# Patient Record
Sex: FEMALE | Race: WHITE | NOT HISPANIC OR LATINO | URBAN - METROPOLITAN AREA
[De-identification: names, ages, dates, MRNs, and addresses within clinical notes are randomized per-mention and may not be internally consistent; named-entity substitution may affect disease eponyms.]

---
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
## 2019-11-11 ENCOUNTER — EMERGENCY (EMERGENCY)
Facility: HOSPITAL | Age: 19
LOS: 1 days | Discharge: HOME | End: 2019-11-11
Attending: EMERGENCY MEDICINE
Payer: COMMERCIAL

## 2019-11-11 VITALS
TEMPERATURE: 99 F | RESPIRATION RATE: 16 BRPM | SYSTOLIC BLOOD PRESSURE: 124 MMHG | OXYGEN SATURATION: 98 % | DIASTOLIC BLOOD PRESSURE: 84 MMHG | HEART RATE: 99 BPM

## 2019-11-11 VITALS
OXYGEN SATURATION: 100 % | SYSTOLIC BLOOD PRESSURE: 142 MMHG | HEART RATE: 112 BPM | HEIGHT: 62 IN | TEMPERATURE: 100 F | DIASTOLIC BLOOD PRESSURE: 95 MMHG | WEIGHT: 175.05 LBS | RESPIRATION RATE: 18 BRPM

## 2019-11-11 LAB
APPEARANCE UR: CLEAR — SIGNIFICANT CHANGE UP
BACTERIA # UR AUTO: ABNORMAL
BILIRUB UR-MCNC: NEGATIVE — SIGNIFICANT CHANGE UP
COLOR SPEC: YELLOW — SIGNIFICANT CHANGE UP
DIFF PNL FLD: ABNORMAL
EPI CELLS # UR: SIGNIFICANT CHANGE UP
GLUCOSE UR QL: 1000 MG/DL
HCG UR QL: NEGATIVE — SIGNIFICANT CHANGE UP
KETONES UR-MCNC: ABNORMAL
LEUKOCYTE ESTERASE UR-ACNC: NEGATIVE — SIGNIFICANT CHANGE UP
NITRITE UR-MCNC: NEGATIVE — SIGNIFICANT CHANGE UP
PH UR: 6 — SIGNIFICANT CHANGE UP (ref 5–8)
PROT UR-MCNC: 30 MG/DL
RBC CASTS # UR COMP ASSIST: ABNORMAL /HPF (ref 0–4)
SP GR SPEC: 1.02 — SIGNIFICANT CHANGE UP (ref 1.01–1.02)
UROBILINOGEN FLD QL: NEGATIVE MG/DL — SIGNIFICANT CHANGE UP
WBC UR QL: SIGNIFICANT CHANGE UP

## 2019-11-11 PROCEDURE — 87086 URINE CULTURE/COLONY COUNT: CPT

## 2019-11-11 PROCEDURE — 99283 EMERGENCY DEPT VISIT LOW MDM: CPT

## 2019-11-11 PROCEDURE — 99284 EMERGENCY DEPT VISIT MOD MDM: CPT

## 2019-11-11 PROCEDURE — 81025 URINE PREGNANCY TEST: CPT

## 2019-11-11 PROCEDURE — 81001 URINALYSIS AUTO W/SCOPE: CPT
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
## 2019-11-11 NOTE — ED ADULT NURSE NOTE - NSIMPLEMENTINTERV_GEN_ALL_ED
Implemented All Universal Safety Interventions:  [place] to call system. Call bell, personal items and telephone within reach. Instruct patient to call for assistance. Room bathroom lighting operational. Non-slip footwear when patient is off stretcher. Physically safe environment: no spills, clutter or unnecessary equipment. Stretcher in lowest position, wheels locked, appropriate side rails in place.

## 2019-11-11 NOTE — ED PROVIDER NOTE - PHYSICAL EXAMINATION
Const: Awake, alert and oriented. In no acute distress. Well appearing.  HEENT: NC/AT. Moist mucous membranes.  Neck:. Soft and supple. Full ROM without pain.  Cardiac:  +S1/S2. No murmurs. Peripheral pulses 2+ and symmetric. No LE edema.  Resp: Speaking in full sentences. No evidence of respiratory distress. No wheezes, rales or rhonchi.  Abd: Soft, non-tender, non-distended. Normal bowel sounds in all 4 quadrants. No guarding or rebound.  Genitlia: No lesions noted, internal: cervical os closed, no laceration noted, minimal bleeding noted in vaginal vault, no foreign body noted   Back: Spine midline and non-tender. No CVAT.  Skin: No rashes, abrasions or lacerations.  Lymph: No cervical lymphadenopathy.  Neuro: Awake, alert & oriented x 3. Moves all extremities symmetrically.

## 2019-11-11 NOTE — ED ADULT NURSE NOTE - OBJECTIVE STATEMENT
Patient is a 19 year old female, A&Ox3 in no apparent distress. c/o vaginal bleeding. Started around 0845pm. Patient states "it doesn't have that same smell as my period". Bleeding is not heavy.

## 2019-11-11 NOTE — ED PROVIDER NOTE - PATIENT PORTAL LINK FT
You can access the FollowMyHealth Patient Portal offered by [facility] by registering at the following website: [email or web address]. By joining [facility]’s FollowMyHealth portal, you will also be able to view your health information using other applications (apps) compatible with our system.

## 2019-11-11 NOTE — ED ADULT TRIAGE NOTE - CHIEF COMPLAINT QUOTE
patient states that she has vaginal bleed with cramping and clots has her LMP Oct 30, denies pregnany

## 2019-11-12 PROBLEM — E11.9 TYPE 2 DIABETES MELLITUS WITHOUT COMPLICATIONS: Chronic | Status: ACTIVE | Noted: 2018-10-29

## 2019-11-13 LAB
CULTURE RESULTS: SIGNIFICANT CHANGE UP
SPECIMEN SOURCE: SIGNIFICANT CHANGE UP

## 2020-06-04 NOTE — ED ADULT NURSE NOTE - NSFALLRSKASSESSTYPE_ED_ALL_ED
Spoke with Litzy via phone-- All concerns addressed including but not limited to diagnosis, treatment plan and overall prognosis.  Improved mentation.  Awaiting for bed now Initial (On Arrival)

## 2020-07-22 ENCOUNTER — TRANSCRIPTION ENCOUNTER (OUTPATIENT)
Age: 20
End: 2020-07-22

## 2020-07-24 ENCOUNTER — EMERGENCY (EMERGENCY)
Facility: HOSPITAL | Age: 20
LOS: 1 days | Discharge: HOME | End: 2020-07-24
Attending: EMERGENCY MEDICINE | Admitting: EMERGENCY MEDICINE
Payer: COMMERCIAL

## 2020-07-24 ENCOUNTER — EMERGENCY (EMERGENCY)
Facility: HOSPITAL | Age: 20
LOS: 0 days | Discharge: HOME | End: 2020-07-24
Attending: EMERGENCY MEDICINE
Payer: COMMERCIAL

## 2020-07-24 VITALS
OXYGEN SATURATION: 99 % | WEIGHT: 164.91 LBS | HEART RATE: 95 BPM | HEIGHT: 66 IN | DIASTOLIC BLOOD PRESSURE: 79 MMHG | RESPIRATION RATE: 18 BRPM | SYSTOLIC BLOOD PRESSURE: 127 MMHG | TEMPERATURE: 99 F

## 2020-07-24 VITALS
HEART RATE: 114 BPM | OXYGEN SATURATION: 100 % | WEIGHT: 164.91 LBS | SYSTOLIC BLOOD PRESSURE: 120 MMHG | HEIGHT: 66 IN | DIASTOLIC BLOOD PRESSURE: 84 MMHG | TEMPERATURE: 99 F | RESPIRATION RATE: 20 BRPM

## 2020-07-24 VITALS — RESPIRATION RATE: 18 BRPM | OXYGEN SATURATION: 100 % | HEART RATE: 102 BPM

## 2020-07-24 DIAGNOSIS — S09.90XA UNSPECIFIED INJURY OF HEAD, INITIAL ENCOUNTER: ICD-10-CM

## 2020-07-24 DIAGNOSIS — Z88.8 ALLERGY STATUS TO OTHER DRUGS, MEDICAMENTS AND BIOLOGICAL SUBSTANCES: ICD-10-CM

## 2020-07-24 DIAGNOSIS — R11.0 NAUSEA: ICD-10-CM
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
## 2020-07-24 DIAGNOSIS — S06.0X0A CONCUSSION WITHOUT LOSS OF CONSCIOUSNESS, INITIAL ENCOUNTER: ICD-10-CM
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
## 2020-07-24 PROCEDURE — 99283 EMERGENCY DEPT VISIT LOW MDM: CPT
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
## 2020-07-24 NOTE — ED ADULT NURSE NOTE - NSFALLRSKINDICATORS_ED_ALL_ED
Problem: Mobility Impaired (Adult and Pediatric)  Goal: *Acute Goals and Plan of Care (Insert Text)  Physical Therapy Goals  Initiated 1/11/2017 and to be accomplished within 7 day(s)  1. Patient will move from supine to sit and sit to supine in bed with minimal assistance. 2. Patient will transfer from bed to chair and chair to bed with minimal assistance using the least restrictive device. 3. Patient will perform sit to stand with minimal assistance. 4. Patient will ambulate with minimal assistance for 50 feet with the least restrictive device. 5. Patient will tolerate sitting EOB 5-10 minutes with Fair to Good balance in order to perform TE.  6. Patient will tolerate sitting up in bedside chair for 1-2 hour/day to improve respiratory capacity. PHYSICAL THERAPY TREATMENT     Patient: Debra Torre (89 y.o. female)  Date: 1/12/2017  Diagnosis: CVA (cerebral vascular accident) [facility]) <principal problem not specified>       Precautions:  Fall  Chart, physical therapy assessment, plan of care and goals were reviewed. ASSESSMENT:  Pt presents asleep with daughter in room. Pt able to be woken up to voice but was tired throughout session and required PT to ask to open her eyes while performing seated TE. Pt's daughter in room and has been with her mother all night, states she was up eating breakfast this morning but has been sleepy since breakfast. Pt transferred sup to sit with MIN assist then was able to sit EOB for TE. Pt required VC for exercise but was still following commands at slow pace. Pt transferred back to supine with Mod/MaxA at end of session and rolled to both sides with ModA to change ace pads 2/2 to incontinence of bladder. Pt left resting comfortably in chair position of bed with alarm activated.    Progression toward goals:  [ ]      Improving appropriately and progressing toward goals  [X]      Improving slowly and progressing toward goals  [ ]      Not making progress toward goals and plan of care will be adjusted       PLAN:  Patient continues to benefit from skilled intervention to address the above impairments. Continue treatment per established plan of care. Discharge Recommendations:  Joaquín Quesada  Further Equipment Recommendations for Discharge:  N/A       G-CODES:      Mobility  Current  CK= 40-59%   Goal  CI= 1-19%. The severity rating is based on the Level of Assistance required for Functional Mobility and ADLs. SUBJECTIVE:   Patient stated Okay.  when asked how she feels today. OBJECTIVE DATA SUMMARY:   Critical Behavior:  Lethargic, follows commands  Functional Mobility Training:  Bed Mobility:   Supine to Sit: Minimum assistance  Sit to Supine: Moderate assistance;Maximum assistance   Balance:  Sitting: Impaired; With support  Sitting - Static: Poor (constant support)  Sitting - Dynamic: Poor (constant support)  Therapeutic Exercises:   Seated with constant support and VC's: LAQ, AP, Hip ABD/ADD  [id number] bilaterally  Pain:  Pt reports 0/10 pain or discomfort prior to treatment. Pt reports 0/10 pain or discomfort post treatment. Activity Tolerance:   Pt tolerated activity well but was limited today 2/2 to being tired this AM.   Please refer to the flowsheet for vital signs taken during this treatment.   After treatment:   [ ] Patient left in no apparent distress sitting up in chair  [X] Patient left in no apparent distress in bed  [X] Call bell left within reach  [ ] Nursing notified  [X] Caregiver present  [X] Bed alarm activated      César Lewis PT   Time Calculation: 27 mins no

## 2020-07-24 NOTE — ED PROVIDER NOTE - PROGRESS NOTE DETAILS
feels much better, now calm, encounter explained to juan daniel, offered anxiety medication, but patient declined

## 2020-07-24 NOTE — ED PROVIDER NOTE - OBJECTIVE STATEMENT
21 yo female hx of dm on metformin BIB juan daniel with panic attack, was evaluated yesterday night at [facility], hit the top of her head against bottom of sink in bathroom while sorting laundry, no LOC, diagnosed with concussion, has no complaint of headache, here, states was hysterically crying so juan daniel drove her for further evaluation.  No nausea/vomiting.  No dizziness, no visual changes.

## 2020-07-24 NOTE — ED PROVIDER NOTE - NSFOLLOWUPINSTRUCTIONS_ED_ALL_ED_FT
Concussion    WHAT YOU NEED TO KNOW:    A concussion is a mild brain injury. It is usually caused by a bump or blow to the head from a fall, a motor vehicle crash, or a sports injury. Sometimes being shaken forcefully may cause a concussion.    DISCHARGE INSTRUCTIONS:    Have someone call 911 for any of the following:     Someone tries to wake you and cannot do so.      You have a seizure, increasing confusion, or a change in personality.      Your speech becomes slurred, or you have new vision problems.    Return to the emergency department if:     You have sudden and new vision problems.      You have a severe headache that does not go away.      You have arm or leg weakness, numbness, or new problems with coordination.      You have blood or clear fluid coming out of the ears or nose.    Contact your healthcare provider if:     You have nausea or are vomiting.      You feel more sleepy than usual.      Your symptoms get worse.      Your symptoms last longer than 6 weeks after the injury.      You have questions or concerns about your condition or care.    Medicines: You may need any of the following:     Acetaminophen decreases pain and fever. It is available without a doctor's order. Ask how much to take and how often to take it. Follow directions. Read the labels of all other medicines you are using to see if they also contain acetaminophen, or ask your doctor or pharmacist. Acetaminophen can cause liver damage if not taken correctly. Do not use more than 4 grams (4,000 milligrams) total of acetaminophen in one day.       NSAIDs help decrease swelling and pain or fever. This medicine is available with or without a doctor's order. NSAIDs can cause stomach bleeding or kidney problems in certain people. If you take blood thinner medicine, always ask your healthcare provider if NSAIDs are safe for you. Always read the medicine label and follow directions.      Take your medicine as directed. Contact your healthcare provider if you think your medicine is not helping or if you have side effects. Tell him or her if you are allergic to any medicine. Keep a list of the medicines, vitamins, and herbs you take. Include the amounts, and when and why you take them. Bring the list or the pill bottles to follow-up visits. Carry your medicine list with you in case of an emergency.    Self-care: Concussion symptoms usually go away within about 10 days, but they may last longer. The following may be recommended to manage your symptoms:     Rest from physical and mental activities as directed. Mental activities are those that require thinking, concentration, and attention. You will need to rest until your symptoms are gone. Rest will allow you to recover from your concussion. Ask your healthcare provider when you can return to work and other daily activities.      Have someone stay with you for the first 24 hours after your injury. Your healthcare provider should be contacted if your symptoms get worse, or you develop new symptoms.      Do not participate in sports and physical activities until your healthcare provider says it is okay. They could make your symptoms worse or lead to another concussion. Your healthcare provider will tell you when it is okay for you to return to sports or physical activities. Ask for more information about sports concussions.    Prevent another concussion:     Wear protective sports equipment that fits properly. Helmets help decrease your risk for a serious brain injury. Talk to your healthcare provider about ways you can decrease your risk for a concussion if you play sports.      Wear your seatbelt every time you travel. This helps to decrease your risk for a head injury if you are in a car accident.     Follow up with your healthcare provider as directed: Write down your questions so you remember to ask them during your visits.     FOLLOW UP EVALUATION  To ensure optimal concussion recovery, follow up with a doctor specialized in concussion management. An evaluation by a specialty concussion program can ensure timely return to activities.    We offer appointments through the [facility] Concussion [facility]’s Hotline at [phone].

## 2020-07-24 NOTE — ED PROVIDER NOTE - NS ED ROS FT
Constitutional: - Fever, - Chills, - Anorexia, - Fatigue, - Night sweats +anxiety  Eyes: - Discharge, - Irritation, - Redness, - Visual changes, - Light sensitivity, - Pain  EARS: - Ear Pain, - Tinnitus, - Decreased hearing  NOSE: - Congestion, - Bloody nose  MOUTH/THROAT: - Vocal Changes, - Drooling, - Sore throat  NECK: - Lumps, - Stiffness, - Pain  CV: - Palpitations, - Chest Pain, - Edema, - Syncope  RESP:  - Cough, - Shortness of Breath, - Dyspnea on Exertion, - Trouble speaking, - Pleuritic pain - Wheezing  GI: - Diarrhea, - Constipation, - Bloody stools, - Nausea, - Vomiting, - Abdominal Pain  : - Dysuria, -Frequency, - Hematuria, - Hesitancy, - Incontinence, - Saddle Anesthesia, - Abnormal discharge  MSK: - Myalgias, - Arthralgias, - Weakness, - Deformities, - Injuries  SKIN: - Color change, - Rash, - Swelling, - Ecchymosis, - Abrasion, - laceration  NEURO: - Change in behavior, - Dec. Alertness, - Headache, - Dizziness, - Change in speech, - Weakness, - Seizure-like activity, - Difficulty ambulating

## 2020-07-24 NOTE — ED PROVIDER NOTE - OBJECTIVE STATEMENT
19 y/o F with h/o DM  p/w mild left parietal headache and nausea s/p head injury this afternoon.  Pt notes she stood up in the laundry room and hit her head on the bottom of a sink.  Pt denies any LOC or amnesia.  Pt has had mild nausea but no vomiting.  Pt denies weakness, slurred speech, ataxia or other neuro deficits.  Pt has had good control of her glucose at home.  Pt denies taking any blood thinners or NSAIDS recently.

## 2020-07-24 NOTE — ED ADULT NURSE NOTE - CHPI ED NUR SYMPTOMS NEG
no seizure/no syncope/no blurred vision/no loss of consciousness/no weakness/no nausea/no dizziness/no confusion/no vomiting/no change in level of consciousness

## 2020-07-24 NOTE — ED PROVIDER NOTE - CARE PLAN
Principal Discharge DX:	Anxiety about health  Secondary Diagnosis:	Concussion without loss of consciousness, initial encounter

## 2020-07-24 NOTE — ED ADULT NURSE NOTE - NS ED NURSE LEVEL OF CONSCIOUSNESS MENTAL STATUS
ECLS Discontinuation Note:    Patient was transported to OR on ECMO via monitored cart with anesthesia and perfusion present for washout and possible decannulation. ECLS was discontinued 6/5/2020 @ 08:44 in the OR per perfusion.    Sandi Griffith, RT, RRT  6/5/2020 9:05 AM       Alert/Awake

## 2020-07-24 NOTE — ED PROVIDER NOTE - PATIENT PORTAL LINK FT
You can access the FollowMyHealth Patient Portal offered by [facility] by registering at the following website: [email or web address]. By joining [employer]’s FollowMyHealth portal, you will also be able to view your health information using other applications (apps) compatible with our system.

## 2020-07-24 NOTE — ED ADULT NURSE NOTE - CHIEF COMPLAINT QUOTE
c/o Headache. hit head on sink yesterday. Seen at [facility] at that time and D/C'd. tonight states "freaking out because hospital didn't tell me if I have a concussion". Brought to ER by boyfriend who "thought I was having a panic attack".

## 2020-07-24 NOTE — ED ADULT TRIAGE NOTE - CHIEF COMPLAINT QUOTE
patient hit head earlier this evening on ledge while standing up after picking up laundry. denies LOC. complains of worsening dizziness and nausea

## 2020-07-24 NOTE — ED ADULT TRIAGE NOTE - CHIEF COMPLAINT QUOTE
headache. hit head on sink yesterday. Seen at [facility] at that time and D/C'd. tonight states "freaking out because hospital didn't tell me if I have a concussion". c/o Headache. hit head on sink yesterday. Seen at [facility] at that time and D/C'd. tonight states "freaking out because hospital didn't tell me if I have a concussion". Brought to ER by boyfriend who "thought I was having a panic attack".

## 2020-07-24 NOTE — ED PROVIDER NOTE - PATIENT PORTAL LINK FT
You can access the FollowMyHealth Patient Portal offered by [facility] by registering at the following website: [email or web address]. By joining [employer]’s FollowMyHealth portal, you will also be able to view your health information using other applications (apps) compatible with our system.

## 2020-07-24 NOTE — ED PROVIDER NOTE - NSFOLLOWUPINSTRUCTIONS_ED_ALL_ED_FT
1) Follow-up with your PMD and concussion program [phone]. Call today / next business day for prompt follow-up.  2) Return to Emergency room for any worsening or persistent pain, weakness, fever, or any other concerning symptoms.  3) See attached instruction sheets for additional information, including information regarding signs and symptoms to look out for, reasons to seek immediate care and other important instructions.  4) Follow-up with any specialists as discussed / noted as well.

## 2020-07-24 NOTE — ED ADULT NURSE NOTE - NSIMPLEMENTINTERV_GEN_ALL_ED
Implemented All Universal Safety Interventions:  [place] to call system. Call bell, personal items and telephone within reach. Instruct patient to call for assistance. Room bathroom lighting operational. Non-slip footwear when patient is off stretcher. Physically safe environment: no spills, clutter or unnecessary equipment. Stretcher in lowest position, wheels locked, appropriate side rails in place.

## 2020-07-24 NOTE — ED ADULT NURSE NOTE - OBJECTIVE STATEMENT
Pt presents to er with complaints of hitting her head on a ledge a few hrs ago, denies change in vision, -laceration, states she feels nauseated at this time and wanted to come in due to paranoia about father dying a couple months ago from a concussion. Pt observed with steady ambulation, alert to person, place, time, situation, speech clear with normal pattern, safety maintained, will continue to monitor.

## 2020-07-24 NOTE — ED ADULT NURSE NOTE - OBJECTIVE STATEMENT
Pt presents to the ED with reports of a panic attack at home. Pt states she was rocking back and forth, felt as though she was going to vomit, crying and could not control herself. Pt was brought to the ED by her fiance because they felt her concussion was getting worse. Pt presently denies any HA, blurry vision, N/V. Speech is clear and gait is steady. Pt is tearful and reports PTSD from her fathers death.

## 2020-07-24 NOTE — ED PROVIDER NOTE - PHYSICAL EXAMINATION
Gen: Alert, NAD, appears anxious  Head/eyes: NC/AT, PERRL, EOMI, normal lids/conjunctiva, no scleral icterus  ENT: airway patent  Neck: supple, no tenderness/meningismus/JVD, Trachea midline  Pulm/lung: Bilateral clear BS, normal resp effort, no wheeze/stridor/retractions  CV/heart: RRR, no M/R/G, +2 dist pulses (radial, pedal DP/PT, popliteal)  GI/Abd: soft, NT/ND, +BS, no guarding/rebound tenderness  Musculoskeletal: no edema/erythema/cyanosis, FROM in all extremities, no C/T/L spine ttp  Skin: no rash, no vesicles, no petechaie, no ecchymosis, no swelling  Neuro: AAOx3, CN 2-12 intact, normal sensation, 5/5 motor strength in all extremities, normal gait, no dysmetria

## 2020-11-02 ENCOUNTER — APPOINTMENT (OUTPATIENT)
Dept: FAMILY MEDICINE | Facility: CLINIC | Age: 20
End: 2020-11-02
Payer: COMMERCIAL

## 2020-11-02 VITALS
TEMPERATURE: 99.1 F | DIASTOLIC BLOOD PRESSURE: 60 MMHG | BODY MASS INDEX: 27.32 KG/M2 | OXYGEN SATURATION: 98 % | WEIGHT: 170 LBS | SYSTOLIC BLOOD PRESSURE: 120 MMHG | HEIGHT: 66 IN | HEART RATE: 79 BPM

## 2020-11-02 DIAGNOSIS — Z86.59 PERSONAL HISTORY OF OTHER MENTAL AND BEHAVIORAL DISORDERS: ICD-10-CM

## 2020-11-02 DIAGNOSIS — Z00.00 ENCOUNTER FOR GENERAL ADULT MEDICAL EXAMINATION W/OUT ABNORMAL FINDINGS: ICD-10-CM

## 2020-11-02 DIAGNOSIS — Z82.49 FAMILY HISTORY OF ISCHEMIC HEART DISEASE AND OTHER DISEASES OF THE CIRCULATORY SYSTEM: ICD-10-CM

## 2020-11-02 DIAGNOSIS — Z86.39 PERSONAL HISTORY OF OTHER ENDOCRINE, NUTRITIONAL AND METABOLIC DISEASE: ICD-10-CM

## 2020-11-02 DIAGNOSIS — Z83.3 FAMILY HISTORY OF DIABETES MELLITUS: ICD-10-CM

## 2020-11-02 DIAGNOSIS — Z78.9 OTHER SPECIFIED HEALTH STATUS: ICD-10-CM

## 2020-11-02 PROCEDURE — 99072 ADDL SUPL MATRL&STAF TM PHE: CPT

## 2020-11-02 PROCEDURE — 99214 OFFICE O/P EST MOD 30 MIN: CPT | Mod: 25

## 2020-11-02 PROCEDURE — 99385 PREV VISIT NEW AGE 18-39: CPT | Mod: 25

## 2020-11-02 RX ORDER — LEVONORGESTREL AND ETHINYL ESTRADIOL 0.1-0.02MG
0.1-2 KIT ORAL
Qty: 84 | Refills: 0 | Status: DISCONTINUED | COMMUNITY
Start: 2020-08-05 | End: 2020-11-02

## 2020-11-02 RX ORDER — METRONIDAZOLE 7.5 MG/G
0.75 GEL VAGINAL
Qty: 70 | Refills: 0 | Status: DISCONTINUED | COMMUNITY
Start: 2020-09-28 | End: 2020-11-02

## 2020-11-02 RX ORDER — METFORMIN HYDROCHLORIDE 1000 MG/1
1000 TABLET, COATED ORAL
Refills: 0 | Status: DISCONTINUED | COMMUNITY
End: 2020-11-02

## 2020-11-02 RX ORDER — ESCITALOPRAM OXALATE 20 MG/1
20 TABLET, FILM COATED ORAL
Refills: 0 | Status: DISCONTINUED | COMMUNITY
End: 2020-11-02

## 2020-11-17 ENCOUNTER — TRANSCRIPTION ENCOUNTER (OUTPATIENT)
Age: 20
End: 2020-11-17

## 2020-11-30 ENCOUNTER — TRANSCRIPTION ENCOUNTER (OUTPATIENT)
Age: 20
End: 2020-11-30

## 2020-12-02 ENCOUNTER — RX RENEWAL (OUTPATIENT)
Age: 20
End: 2020-12-02

## 2020-12-29 ENCOUNTER — RX RENEWAL (OUTPATIENT)
Age: 20
End: 2020-12-29

## 2021-01-22 ENCOUNTER — RX RENEWAL (OUTPATIENT)
Age: 21
End: 2021-01-22

## 2021-03-19 ENCOUNTER — APPOINTMENT (OUTPATIENT)
Dept: FAMILY MEDICINE | Facility: CLINIC | Age: 21
End: 2021-03-19
Payer: COMMERCIAL

## 2021-03-19 VITALS
WEIGHT: 177 LBS | DIASTOLIC BLOOD PRESSURE: 70 MMHG | HEART RATE: 113 BPM | BODY MASS INDEX: 28.45 KG/M2 | SYSTOLIC BLOOD PRESSURE: 110 MMHG | HEIGHT: 66 IN

## 2021-03-19 DIAGNOSIS — E11.9 TYPE 2 DIABETES MELLITUS W/OUT COMPLICATIONS: ICD-10-CM

## 2021-03-19 DIAGNOSIS — F32.9 MAJOR DEPRESSIVE DISORDER, SINGLE EPISODE, UNSPECIFIED: ICD-10-CM

## 2021-03-19 PROCEDURE — G0444 DEPRESSION SCREEN ANNUAL: CPT

## 2021-03-19 PROCEDURE — 99072 ADDL SUPL MATRL&STAF TM PHE: CPT

## 2021-03-19 PROCEDURE — 99214 OFFICE O/P EST MOD 30 MIN: CPT | Mod: 25

## 2021-03-19 PROCEDURE — 36415 COLL VENOUS BLD VENIPUNCTURE: CPT

## 2021-03-19 NOTE — HISTORY OF PRESENT ILLNESS
[FreeTextEntry1] : Med Renewal/ Follow up [de-identified] : MILLI PIZARRO is a 21 year year old female patient presenting to the clinic today for follow up and medication renewal. Mood is okay. She looks very tired and states she does not intake enough water. She has not done blood work because she lost the script.\par

## 2021-03-19 NOTE — ASSESSMENT
[FreeTextEntry1] : MILLI PIZARRO is a 21 year year old female patient presenting to the clinic today for follow up and medication renewal. \par \par # depression\par # DM\par \par # PHQ- 9 Behavioral Health Screenin

## 2021-03-19 NOTE — END OF VISIT
[FreeTextEntry3] : This note was written by Shelley Rae on 03/19/2021, acting as a scribe for Dr. Nikolai MD.\par \par All medical record entries were at my, Dr. Sangeeta Menchaca MD, direction and personally dictated by me in 03/19/2021. I have personally reviewed the chart and agree that the record accurately reflects my personal performance of the history, physical exam, assessment, and plan.\par

## 2021-03-22 LAB
ALBUMIN SERPL ELPH-MCNC: 4.4 G/DL
ALP BLD-CCNC: 59 U/L
ALT SERPL-CCNC: 29 U/L
ANION GAP SERPL CALC-SCNC: 16 MMOL/L
AST SERPL-CCNC: 19 U/L
BASOPHILS # BLD AUTO: 0.06 K/UL
BASOPHILS NFR BLD AUTO: 0.6 %
BILIRUB SERPL-MCNC: 0.2 MG/DL
BUN SERPL-MCNC: 8 MG/DL
CALCIUM SERPL-MCNC: 9.6 MG/DL
CHLORIDE SERPL-SCNC: 102 MMOL/L
CO2 SERPL-SCNC: 22 MMOL/L
CREAT SERPL-MCNC: 0.35 MG/DL
EOSINOPHIL # BLD AUTO: 0.15 K/UL
EOSINOPHIL NFR BLD AUTO: 1.4 %
ESTIMATED AVERAGE GLUCOSE: 186 MG/DL
GLUCOSE SERPL-MCNC: 247 MG/DL
HBA1C MFR BLD HPLC: 8.1 %
HCT VFR BLD CALC: 39.6 %
HGB BLD-MCNC: 12.9 G/DL
IMM GRANULOCYTES NFR BLD AUTO: 0.5 %
LYMPHOCYTES # BLD AUTO: 3.69 K/UL
LYMPHOCYTES NFR BLD AUTO: 34.4 %
MAN DIFF?: NORMAL
MCHC RBC-ENTMCNC: 27.9 PG
MCHC RBC-ENTMCNC: 32.6 GM/DL
MCV RBC AUTO: 85.5 FL
MONOCYTES # BLD AUTO: 0.57 K/UL
MONOCYTES NFR BLD AUTO: 5.3 %
NEUTROPHILS # BLD AUTO: 6.22 K/UL
NEUTROPHILS NFR BLD AUTO: 57.8 %
PLATELET # BLD AUTO: 333 K/UL
POTASSIUM SERPL-SCNC: 3.9 MMOL/L
PROT SERPL-MCNC: 7.1 G/DL
RBC # BLD: 4.63 M/UL
RBC # FLD: 12.9 %
SODIUM SERPL-SCNC: 139 MMOL/L
TSH SERPL-ACNC: 1.72 UIU/ML
WBC # FLD AUTO: 10.74 K/UL

## 2021-04-20 ENCOUNTER — RX RENEWAL (OUTPATIENT)
Age: 21
End: 2021-04-20

## 2021-06-23 ENCOUNTER — RX RENEWAL (OUTPATIENT)
Age: 21
End: 2021-06-23

## 2021-06-27 RX ORDER — ESCITALOPRAM OXALATE 20 MG/1
20 TABLET ORAL
Qty: 30 | Refills: 0 | Status: ACTIVE | COMMUNITY
Start: 2020-04-30 | End: 1900-01-01

## 2021-06-29 ENCOUNTER — RX RENEWAL (OUTPATIENT)
Age: 21
End: 2021-06-29

## 2021-09-01 ENCOUNTER — RX RENEWAL (OUTPATIENT)
Age: 21
End: 2021-09-01

## 2021-09-01 RX ORDER — METFORMIN HYDROCHLORIDE 1000 MG/1
1000 TABLET, COATED ORAL
Qty: 60 | Refills: 0 | Status: ACTIVE | COMMUNITY
Start: 2020-05-02 | End: 1900-01-01

## 2021-12-27 ENCOUNTER — EMERGENCY (EMERGENCY)
Facility: HOSPITAL | Age: 21
LOS: 1 days | Discharge: LEFT BEFORE DISCHARGE | End: 2021-12-27
Payer: SELF-PAY

## 2021-12-27 PROCEDURE — L9992: CPT

## 2021-12-29 ENCOUNTER — RX RENEWAL (OUTPATIENT)
Age: 21
End: 2021-12-29

## 2022-08-25 ENCOUNTER — TRANSCRIPTION ENCOUNTER (OUTPATIENT)
Age: 22
End: 2022-08-25

## 2022-09-19 ENCOUNTER — INPATIENT (INPATIENT)
Facility: HOSPITAL | Age: 22
LOS: 3 days | Discharge: HOME | End: 2022-09-23
Attending: PSYCHIATRY & NEUROLOGY | Admitting: PSYCHIATRY & NEUROLOGY
Payer: COMMERCIAL

## 2022-09-19 VITALS
HEIGHT: 66 IN | HEART RATE: 106 BPM | SYSTOLIC BLOOD PRESSURE: 128 MMHG | OXYGEN SATURATION: 98 % | DIASTOLIC BLOOD PRESSURE: 78 MMHG | TEMPERATURE: 97 F | RESPIRATION RATE: 20 BRPM

## 2022-09-19 DIAGNOSIS — F33.9 MAJOR DEPRESSIVE DISORDER, RECURRENT, UNSPECIFIED: ICD-10-CM

## 2022-09-19 LAB
ALBUMIN SERPL ELPH-MCNC: 4.5 G/DL — SIGNIFICANT CHANGE UP (ref 3.3–5)
ALP SERPL-CCNC: 44 U/L — SIGNIFICANT CHANGE UP (ref 40–120)
ALT FLD-CCNC: 17 U/L — SIGNIFICANT CHANGE UP (ref 4–33)
AMPHET UR-MCNC: NEGATIVE — SIGNIFICANT CHANGE UP
ANION GAP SERPL CALC-SCNC: 15 MMOL/L — HIGH (ref 7–14)
APAP SERPL-MCNC: <10 UG/ML — LOW (ref 15–25)
APPEARANCE UR: ABNORMAL
AST SERPL-CCNC: 18 U/L — SIGNIFICANT CHANGE UP (ref 4–32)
BACTERIA # UR AUTO: ABNORMAL
BARBITURATES UR SCN-MCNC: NEGATIVE — SIGNIFICANT CHANGE UP
BASOPHILS # BLD AUTO: 0.04 K/UL — SIGNIFICANT CHANGE UP (ref 0–0.2)
BASOPHILS NFR BLD AUTO: 0.4 % — SIGNIFICANT CHANGE UP (ref 0–2)
BENZODIAZ UR-MCNC: NEGATIVE — SIGNIFICANT CHANGE UP
BILIRUB SERPL-MCNC: 0.3 MG/DL — SIGNIFICANT CHANGE UP (ref 0.2–1.2)
BILIRUB UR-MCNC: NEGATIVE — SIGNIFICANT CHANGE UP
BUN SERPL-MCNC: 6 MG/DL — LOW (ref 7–23)
CALCIUM SERPL-MCNC: 9.5 MG/DL — SIGNIFICANT CHANGE UP (ref 8.4–10.5)
CHLORIDE SERPL-SCNC: 103 MMOL/L — SIGNIFICANT CHANGE UP (ref 98–107)
CO2 SERPL-SCNC: 23 MMOL/L — SIGNIFICANT CHANGE UP (ref 22–31)
COCAINE METAB.OTHER UR-MCNC: NEGATIVE — SIGNIFICANT CHANGE UP
COLOR SPEC: SIGNIFICANT CHANGE UP
CREAT SERPL-MCNC: 0.49 MG/DL — LOW (ref 0.5–1.3)
CREATININE URINE RESULT, DAU: 104 MG/DL — SIGNIFICANT CHANGE UP
DIFF PNL FLD: ABNORMAL
EGFR: 136 ML/MIN/1.73M2 — SIGNIFICANT CHANGE UP
EOSINOPHIL # BLD AUTO: 0.05 K/UL — SIGNIFICANT CHANGE UP (ref 0–0.5)
EOSINOPHIL NFR BLD AUTO: 0.5 % — SIGNIFICANT CHANGE UP (ref 0–6)
EPI CELLS # UR: 18 /HPF — HIGH (ref 0–5)
ETHANOL SERPL-MCNC: <10 MG/DL — SIGNIFICANT CHANGE UP
FLUAV AG NPH QL: SIGNIFICANT CHANGE UP
FLUBV AG NPH QL: SIGNIFICANT CHANGE UP
GLUCOSE SERPL-MCNC: 273 MG/DL — HIGH (ref 70–99)
GLUCOSE UR QL: ABNORMAL
HCT VFR BLD CALC: 40.5 % — SIGNIFICANT CHANGE UP (ref 34.5–45)
HGB BLD-MCNC: 13.3 G/DL — SIGNIFICANT CHANGE UP (ref 11.5–15.5)
HYALINE CASTS # UR AUTO: 1 /LPF — SIGNIFICANT CHANGE UP (ref 0–7)
IANC: 6.96 K/UL — SIGNIFICANT CHANGE UP (ref 1.8–7.4)
IMM GRANULOCYTES NFR BLD AUTO: 0.5 % — SIGNIFICANT CHANGE UP (ref 0–0.9)
KETONES UR-MCNC: NEGATIVE — SIGNIFICANT CHANGE UP
LEUKOCYTE ESTERASE UR-ACNC: ABNORMAL
LYMPHOCYTES # BLD AUTO: 2.09 K/UL — SIGNIFICANT CHANGE UP (ref 1–3.3)
LYMPHOCYTES # BLD AUTO: 21.9 % — SIGNIFICANT CHANGE UP (ref 13–44)
MCHC RBC-ENTMCNC: 28.3 PG — SIGNIFICANT CHANGE UP (ref 27–34)
MCHC RBC-ENTMCNC: 32.8 GM/DL — SIGNIFICANT CHANGE UP (ref 32–36)
MCV RBC AUTO: 86.2 FL — SIGNIFICANT CHANGE UP (ref 80–100)
METHADONE UR-MCNC: NEGATIVE — SIGNIFICANT CHANGE UP
MONOCYTES # BLD AUTO: 0.36 K/UL — SIGNIFICANT CHANGE UP (ref 0–0.9)
MONOCYTES NFR BLD AUTO: 3.8 % — SIGNIFICANT CHANGE UP (ref 2–14)
NEUTROPHILS # BLD AUTO: 6.96 K/UL — SIGNIFICANT CHANGE UP (ref 1.8–7.4)
NEUTROPHILS NFR BLD AUTO: 72.9 % — SIGNIFICANT CHANGE UP (ref 43–77)
NITRITE UR-MCNC: NEGATIVE — SIGNIFICANT CHANGE UP
NRBC # BLD: 0 /100 WBCS — SIGNIFICANT CHANGE UP (ref 0–0)
NRBC # FLD: 0 K/UL — SIGNIFICANT CHANGE UP (ref 0–0)
OPIATES UR-MCNC: NEGATIVE — SIGNIFICANT CHANGE UP
OXYCODONE UR-MCNC: NEGATIVE — SIGNIFICANT CHANGE UP
PCP SPEC-MCNC: SIGNIFICANT CHANGE UP
PCP UR-MCNC: NEGATIVE — SIGNIFICANT CHANGE UP
PH UR: 6 — SIGNIFICANT CHANGE UP (ref 5–8)
PLATELET # BLD AUTO: 395 K/UL — SIGNIFICANT CHANGE UP (ref 150–400)
POTASSIUM SERPL-MCNC: 3.8 MMOL/L — SIGNIFICANT CHANGE UP (ref 3.5–5.3)
POTASSIUM SERPL-SCNC: 3.8 MMOL/L — SIGNIFICANT CHANGE UP (ref 3.5–5.3)
PROT SERPL-MCNC: 7.8 G/DL — SIGNIFICANT CHANGE UP (ref 6–8.3)
PROT UR-MCNC: ABNORMAL
RBC # BLD: 4.7 M/UL — SIGNIFICANT CHANGE UP (ref 3.8–5.2)
RBC # FLD: 12.8 % — SIGNIFICANT CHANGE UP (ref 10.3–14.5)
RBC CASTS # UR COMP ASSIST: 2 /HPF — SIGNIFICANT CHANGE UP (ref 0–4)
RSV RNA NPH QL NAA+NON-PROBE: SIGNIFICANT CHANGE UP
SALICYLATES SERPL-MCNC: <0.3 MG/DL — LOW (ref 15–30)
SARS-COV-2 RNA SPEC QL NAA+PROBE: SIGNIFICANT CHANGE UP
SODIUM SERPL-SCNC: 141 MMOL/L — SIGNIFICANT CHANGE UP (ref 135–145)
SP GR SPEC: 1.01 — SIGNIFICANT CHANGE UP (ref 1.01–1.05)
THC UR QL: NEGATIVE — SIGNIFICANT CHANGE UP
TOXICOLOGY SCREEN, DRUGS OF ABUSE, SERUM RESULT: SIGNIFICANT CHANGE UP
UROBILINOGEN FLD QL: SIGNIFICANT CHANGE UP
WBC # BLD: 9.55 K/UL — SIGNIFICANT CHANGE UP (ref 3.8–10.5)
WBC # FLD AUTO: 9.55 K/UL — SIGNIFICANT CHANGE UP (ref 3.8–10.5)
WBC UR QL: 27 /HPF — HIGH (ref 0–5)

## 2022-09-19 PROCEDURE — 93010 ELECTROCARDIOGRAM REPORT: CPT

## 2022-09-19 PROCEDURE — 99285 EMERGENCY DEPT VISIT HI MDM: CPT

## 2022-09-19 PROCEDURE — 99285 EMERGENCY DEPT VISIT HI MDM: CPT | Mod: 25

## 2022-09-19 RX ORDER — METFORMIN HYDROCHLORIDE 850 MG/1
500 TABLET ORAL
Refills: 0 | Status: DISCONTINUED | OUTPATIENT
Start: 2022-09-19 | End: 2022-09-23

## 2022-09-19 RX ORDER — ESCITALOPRAM OXALATE 10 MG/1
15 TABLET, FILM COATED ORAL DAILY
Refills: 0 | Status: DISCONTINUED | OUTPATIENT
Start: 2022-09-19 | End: 2022-09-23

## 2022-09-19 RX ADMIN — METFORMIN HYDROCHLORIDE 500 MILLIGRAM(S): 850 TABLET ORAL at 20:58

## 2022-09-19 NOTE — ED BEHAVIORAL HEALTH ASSESSMENT NOTE - HPI (INCLUDE ILLNESS QUALITY, SEVERITY, DURATION, TIMING, CONTEXT, MODIFYING FACTORS, ASSOCIATED SIGNS AND SYMPTOMS)
22F, domiciled in apartment in mom's house, employed at a , student at [employer], with a reported history of depression, anxiety and ptsd, no prior psychiatric hospitalizations, reporting 4 prior suicide attempts via hanging, recreational cannabis user (edibles), no history of violence, PMH DM2, brought in by ex-boyfriend for worsening depression and suicidality.  Patient states "I attempted yesterday" and describes attempting to hang self but "did not jump". A friend came over and discovered the scarf around the neck. Patient reports feeling ambivalent today, not currently suicidal, "it is not the answer to my problems" and is not sure if she would have completed the act. Patient reports 2 major stressors this weekend, discovering her financial aid for school fell through, and breaking up with boyfriend. Patient states life is a "[place] of sh*t". Notes there are times things are going well, she feels really hopeful, and other times feels "what is the point", stops taking meds. Describes mood as "inconsistent" and notes extreme emotional reactivity to stressors. Currently mood is depressed, appetite is low, energy is low, feels decreased enjoyment in things, guilt. No homicidal ideation, concentration is ok. Endorses anxiety with racing/ruminative thoughts, panic attacks (short of breath, feels hot and dizzy), lasting anywhere from a few minutes to 'hours'. Denies current psychotic symptoms though describes prior AH (mostly pt's voice) and VH (sees a friend's face as a monster, sees people running along the street) - notes these have not occurred recently since being on lexapro. Reports lexapro is prescribed by Dr. Gale, whom she sees every 3-6 months. Currently poor adherence to Lexapro. Prior trial of Zoloft which pt did not like. No other med trials. Denies history of self injurious behavior. Denies prior history of excess energy, decreased need for sleep, hypersexuality, excess spending, talkativeness.   See  note for collateral information.   Attempted to call Dr. Gale, unable to locate correct office #. Patient also reports meeting with therapist last week but does not remember contact information.

## 2022-09-19 NOTE — ED ADULT NURSE NOTE - NSIMPLEMENTINTERV_GEN_ALL_ED
Implemented All Universal Safety Interventions:  [place] to call system. Call bell, personal items and telephone within reach. Instruct patient to call for assistance. Room bathroom lighting operational. Non-slip footwear when patient is off stretcher. Physically safe environment: no spills, clutter or unnecessary equipment. Stretcher in lowest position, wheels locked, appropriate side rails in place.

## 2022-09-19 NOTE — ED BEHAVIORAL HEALTH ASSESSMENT NOTE - OTHER PAST PSYCHIATRIC HISTORY (INCLUDE DETAILS REGARDING ONSET, COURSE OF ILLNESS, INPATIENT/OUTPATIENT TREATMENT)
no prior psychiatric admissions. currently prescribed Lexapro 15mg by Dr Gale. had a prior trial of zoloft, pt did not like it. no reported history of self injurious behavior. reports 4 prior suicide attempt via attempted hanging

## 2022-09-19 NOTE — ED PROVIDER NOTE - PROGRESS NOTE DETAILS
NORBERTO Coffey- blood work wnl, ua - contamination, - pt denies any urgency , frequencies, painful urination, reports recently finished her menstrual period, - ua culture sent

## 2022-09-19 NOTE — ED PROVIDER NOTE - NSICDXPASTMEDICALHX_GEN_ALL_CORE_FT
PAST MEDICAL HISTORY:  MDD (major depressive disorder)     Type 2 diabetes mellitus without complication, without long-term current use of insulin

## 2022-09-19 NOTE — BH PATIENT PROFILE - HOME MEDICATIONS
Lexapro , 15 milligram(s) orally once a day  metFORMIN 500 mg oral tablet , 1 tab(s) orally 2 times a day

## 2022-09-19 NOTE — ED BEHAVIORAL HEALTH ASSESSMENT NOTE - RISK ASSESSMENT
Moderate Acute Suicide Risk acute risks include depressive episode, recent suicide attempt, educational and relationship stressors, poor medication adherence. chronic risks include family history of bipolar, history of trauma, prior suicide attempts/gestures as an adolescent. protective factors include help seeking behavior, employed, stable domicile, no history of violence, no known access to weapons, no current SI/HI. MODERATE IMMINENT risk to self requiring inpatient level of care as above.

## 2022-09-19 NOTE — ED ADULT TRIAGE NOTE - CHIEF COMPLAINT QUOTE
Pt attempted suicide by hanging herself yesterday. Pt with no visible bruising to neck. Pt non compliant with medication.

## 2022-09-19 NOTE — ED BEHAVIORAL HEALTH NOTE - BEHAVIORAL HEALTH NOTE
AS per request of provider, writer contacted patient’s ex boyfriend Bakari ( [phone]) to obtain collateral information. The following information is per Bakari.    Patient is a 21 YO female domiciled w/ uncle and mother, hx of depression, BIB Ex boyfriend for suicide attempt by hanging yesterday. Bakari reports they broke up on 9/17 and patient didn’t take it well. Patient reported to him yesterday that she tried to hang herself and her friend interrupted the attempt. Patient described to Bakair that she was hanging and was in and out of conscious. Bakari says no bruises were visible. Patient was advised to go to [facility] yesterday by Bakari. Bakari says patient went and was advised to go to the ED which she did not follow through with. Bakari offered to take the patient to the ED today and patient agreed. Bakari reports patient has multiple suicide attempts but did not know the details. He reports patient has a psych/therapist but does not know details. Bakari says patient is prescribed psych medication but is not compliant w/ medication. Bakari says patient and him dated for 3 weeks and have known each other for a month. Stressors include the breakup, patient losing financial aid for college and dropping out of [employer] last week because of it and a poor relationship w/ her mother. Patient is not violent or aggressive. Patient uses edible marijuana 2x a week. Patient has no prior psych admissions. Medical problems include diabetes (on metformin). Bakari unsure of patient’s covid vaccine status and reports she has no recent travel or exposure.

## 2022-09-19 NOTE — ED ADULT NURSE NOTE - OBJECTIVE STATEMENT
Pt attempted suicide by hanging herself yesterday. Pt with no visible bruising to neck. Pt non compliant with medication.  Patient brought to  area for evaluation. Pt states she tied the rope to a pole and was putting it around her neck but her friend who knew that she was not well and she came over before she could pull the rope around her neck. Calm and cooperative at this time. L:abs drawn and sent.  Waiting for results/admission.  NHUNG Long

## 2022-09-19 NOTE — ED BEHAVIORAL HEALTH ASSESSMENT NOTE - ADDITIONAL DETAILS ALL
attempted hanging yesterday, interrupted by friend. reports 3 episodes of prior attempted hanging as an adolescent (twice at age 8, once at age 13)

## 2022-09-19 NOTE — BH PATIENT PROFILE - FALL HARM RISK - FALL HARM RISK
Chief Complaint   Patient presents with    Blood Pressure Check     Patient in office today for bp check; have no c/o.    1. Have you been to the ER, urgent care clinic since your last visit? Hospitalized since your last visit? No    2. Have you seen or consulted any other health care providers outside of the [address] [place] [address] since your last visit? Include any pap smears or colon screening. No    Pt tried the prozac but did not toelrate SEs. Has been taking klonopin as needed for acute anxiety which helps. History of slamming her left index finger in the car door. Had 5 sutures placed but they did not do any imaging in ED. Painful with certain movements. Pain with ROM. Hard time bending the joint. Denies any other concerns at this time. Chief Complaint   Patient presents with    Blood Pressure Check     she is a 44y.o. year old female who presents for evalution. Reviewed PmHx, RxHx, FmHx, SocHx, AllgHx and updated and dated in the chart. Review of Systems - negative except as listed above in the HPI    Objective:     Vitals:    01/20/17 1112   BP: 144/88   Pulse: 83   Resp: 18   Temp: 98.8 °F (37.1 °C)   TempSrc: Oral   SpO2: 97%   Weight: 317 lb (143.8 kg)   Height: 5' 8\" (1.727 m)     Physical Examination: General appearance - alert, well appearing, and in no distress  Chest - clear to auscultation, no wheezes, rales or rhonchi, symmetric air entry  Heart - normal rate, regular rhythm, normal S1, S2, no murmurs  Musculoskeletal - abnormal exam of left second digit - PIP joint is swollen, unable to bend    Assessment/ Plan:   Vero was seen today for blood pressure check. Diagnoses and all orders for this visit:    Prediabetes  Continue metformin daily. Continue with efforts to follow a healthy diet and exercise as tolerated. Pt is motivated to get a1c down. Follow up in 3 months to repeat labs. Essential hypertension  BP improved.  Continue norvasc daily and continue to monitor. Finger pain, left  -     XR 2ND FINGER LT MIN 2 V; Future  Recommended pt get xray for further evaluation. Will notify results and deviate plan based on findings. Follow-up Disposition:  Return in about 3 months (around 4/20/2017) for repeat labs and follow up. I have discussed the diagnosis with the patient and the intended plan as seen in the above orders. The patient has received an after-visit summary and questions were answered concerning future plans. Medication Side Effects and Warnings were discussed with patient: yes  Patient Labs were reviewed and or requested: yes  Patient Past Records were reviewed and or requested  yes  Patient / Caregiver Understanding of treatment plan was verbalized during office visit AUSTIN Joyce    Patient Instructions   Try eating a high protein breakfast. I recommend nonfat greek yogurt every morning with a small amount of the trail mix or granola. I recommend eating an apple as a mid morning snack. You can add a little bit of peanut butter for the extra protein. For lunch I recommend a protein like fish, chicken, turkey breast, etc. If you are going to have a sandwich try whole grain bread and start with half a sandwich to see if that satiates you. You can put as many veggies on it as you want like onion, tomato, lettuce. Try mustard instead of villa because villa is high in fat. If you need a crunchy/salty side try pop chips or santiago chips. They are less fattening that potato chips. For a mid afternoon snack I recommend carrots with humus. Humus is so good for you and so filling due to the high protein. For dinner stick with the protein like fish, chicken or turkey. For starch I recommend quinoa, brown rice, sweet potato, or cous cous. Always eat a vegetable with your dinner. Spinach and kale are great because they fill you up and are full of vitamins and minerals.      I know that its difficult to stop craving sweets in the evening. For this I recommend purple or green grapes because they will curb your craving. Another idea is any of those \"100 calorie\" desserts like skinny cow. Just make sure you only have one. Drink as much water as you possibly can all day long. Try adding fresh slices of lemon to curb cravings. Also purchase kellen seeds. If you sprinkle them on your food, they \"blow up\" in the stomach and make you feel anderson faster. I usually put in on yogurt or peanut butter toast. You can really put it on anything and you can find them pretty much everywhere these days. You should join [employer] at www.pinterest.com. They have an endless supply of healthy recipes. Prediabetes: Care Instructions  Your Care Instructions  Prediabetes is a warning sign that you are at risk for getting type 2 diabetes. It means that your blood sugar is higher than it should be. The food you eat turns into sugar, which your body uses for energy. Normally, an organ called the pancreas makes insulin, which allows the sugar in your blood to get into your body's cells. But when your body can't use insulin the right way, the sugar doesn't move into cells. It stays in your blood instead. This is called insulin resistance. The buildup of sugar in the blood causes prediabetes. The good news is that lifestyle changes may help you get your blood sugar back to normal and help you avoid or delay diabetes. Follow-up care is a key part of your treatment and safety. Be sure to make and go to all appointments, and call your doctor if you are having problems. It's also a good idea to know your test results and keep a list of the medicines you take. How can you care for yourself at home? · Watch your weight. A healthy weight helps your body use insulin properly. · Limit the amount of calories, sweets, and unhealthy fat you eat. Ask your doctor if you should see a dietitian.  A registered dietitian can help you create meal plans that fit your lifestyle. · Get at least 30 minutes of exercise on most days of the week. Exercise helps control your blood sugar. It also helps you maintain a healthy weight. Walking is a good choice. You also may want to do other activities, such as running, swimming, cycling, or playing tennis or team sports. · Do not smoke. Smoking can make prediabetes worse. If you need help quitting, talk to your doctor about stop-smoking programs and medicines. These can increase your chances of quitting for good. · If your doctor prescribed medicines, take them exactly as prescribed. Call your doctor if you think you are having a problem with your medicine. You will get more details on the specific medicines your doctor prescribes. When should you call for help? Watch closely for changes in your health, and be sure to contact your doctor if:  · You have any symptoms of diabetes. These may include:  ¨ Being thirsty more often. ¨ Urinating more. ¨ Being hungrier. ¨ Losing weight. ¨ Being very tired. ¨ Having blurry vision. · You have a wound that will not heal.  · You have an infection that will not go away. · You have problems with your blood pressure. · You want more information about diabetes and how you can keep from getting it. Where can you learn more? Go to http://patricia-nan.info/. Enter I222 in the search box to learn more about \"Prediabetes: Care Instructions. \"  Current as of: May 23, 2016  Content Version: 11.1  © 2582-1399 [employer]. Care instructions adapted under license by [employer] (which disclaims liability or warranty for this information). If you have questions about a medical condition or this instruction, always ask your healthcare professional. [address] any warranty or liability for your use of this information. No indicators present

## 2022-09-19 NOTE — ED ADULT NURSE NOTE - NSICDXPASTMEDICALHX_GEN_ALL_CORE_FT
PAST MEDICAL HISTORY:  Type 2 diabetes mellitus without complication, without long-term current use of insulin
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
## 2022-09-19 NOTE — BH PATIENT PROFILE - NSPROEXTENSIONSOFSELF_GEN_A_NUR
Hatchet Flap Text: The defect edges were debeveled with a #15 scalpel blade.  Given the location of the defect, shape of the defect and the proximity to free margins a hatchet flap was deemed most appropriate.  Using a sterile surgical marker, an appropriate hatchet flap was drawn incorporating the defect and placing the expected incisions within the relaxed skin tension lines where possible.    The area thus outlined was incised deep to adipose tissue with a #15 scalpel blade.  The skin margins were undermined to an appropriate distance in all directions utilizing iris scissors. dentures

## 2022-09-19 NOTE — ED BEHAVIORAL HEALTH ASSESSMENT NOTE - MEDICAL ISSUES AND PLAN (INCLUDE STANDING AND PRN MEDICATION)
DM2 - continue metformin 500mg BID DM2 - continue metformin 500mg BID. FOLLOW UP URINE CULTURE, currently denies any uti symptoms, UCG negative

## 2022-09-19 NOTE — ED BEHAVIORAL HEALTH ASSESSMENT NOTE - SUMMARY
22F, domiciled in apartment in mom's house, employed at a , student at [employer], with a reported history of depression, anxiety and ptsd, no prior psychiatric hospitalizations, reporting 4 prior suicide attempts via hanging, recreational cannabis user (edibles), no history of violence, [facility] DM2, brought in by ex-boyfriend for worsening depression and suicidality.  Patient describing symptoms of major depression in addition to emotional reactivity that could be the result of maladaptive personality traits. Patient inquired about bipolar disorder given family history, but this evaluation did not elicit any criteria for such. Patient attempted to hang self yesterday and was interrupted. Although not actively suicidal at time of assessment, pt is reporting depression and despair, and seeking admission to address symptoms. Patient is a risk to self and appropriate for inpatient level of care, has capacity for voluntary legal status.

## 2022-09-19 NOTE — ED BEHAVIORAL HEALTH ASSESSMENT NOTE - DESCRIPTION
DM2 calm, cooperative  Vital Signs Last 24 Hrs  T(C): 36.3 (19 Sep 2022 13:56), Max: 36.3 (19 Sep 2022 13:56)  T(F): 97.3 (19 Sep 2022 13:56), Max: 97.3 (19 Sep 2022 13:56)  HR: 106 (19 Sep 2022 13:56) (106 - 106)  BP: 128/78 (19 Sep 2022 13:56) (128/78 - 128/78)  BP(mean): --  RR: 20 (19 Sep 2022 13:56) (20 - 20)  SpO2: 98% (19 Sep 2022 13:56) (98% - 98%)    Parameters below as of 19 Sep 2022 13:56  Patient On (Oxygen Delivery Method): room air lives in 2 family house with mom. 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 recently ended a romantic relationship
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
## 2022-09-19 NOTE — BH PATIENT PROFILE - WAS THIS WITHIN THE PAST 3 MONTHS?
Ok I sent it
Patient would like the Augmentin sent 
Pt did virtual today with Emile Colón gave him the antibiotic- azithromycin  Can he take this with his heart condition?     Collin called about this- the pharmacy also questioned about filling the antibiotic
Yes but if they would rather something else I can send in augmentin
Yes

## 2022-09-19 NOTE — ED BEHAVIORAL HEALTH NOTE - BEHAVIORAL HEALTH NOTE
COVID Exposure Screen- Patient  1.	*Have you had a COVID-19 test in the last 90 days?  (  ) Yes   ( X ) No   (  ) Unknown- Reason: _____  IF YES PROCEED TO QUESTION #2. IF NO OR UNKNOWN, PLEASE SKIP TO QUESTION #3.  2.	Date of test(s) and result(s): ________  3.	*Have you tested positive for COVID-19 antibodies? (  ) Yes   ( X ) No   (  ) Unknown- Reason: _____  IF YES PROCEED TO QUESTION #4. IF NO or UNKNOWN, PLEASE SKIP TO QUESTION #5.  4.	Date of positive antibody test: ________  5.	*Have you received 2 doses of the COVID-19 vaccine? ( X ) Yes   (  ) No   (  ) Unknown- Reason: _____   IF YES PROCEED TO QUESTION #6. IF NO or UNKNOWN, PLEASE SKIP TO QUESTION #7.  6.	Date of second dose: ________  7.	*In the past 10 days, have you been around anyone with a positive COVID-19 test?* (  ) Yes   ( X ) No   (  ) Unknown- Reason: ____  IF YES PROCEED TO QUESTION #8. IF NO or UNKNOWN, PLEASE SKIP TO QUESTION #13.  8.	Were you within 6 feet of them for at least 15 minutes? (  ) Yes   (  ) No   (  ) Unknown- Reason: _____  9.	Have you provided care for them? (  ) Yes   (  ) No   (  ) Unknown- Reason: ______  10.	Have you had direct physical contact with them (touched, hugged, or kissed them)? (  ) Yes   (  ) No    (  ) Unknown- Reason: _____  11.	Have you shared eating or drinking utensils with them? (  ) Yes   (  ) No    (  ) Unknown- Reason: ____  12.	Have they sneezed, coughed, or somehow gotten respiratory droplets on you? (  ) Yes   (  ) No    (  ) Unknown- Reason: ______  13.	*Have you been out of [place] within the past 10 days?* (  ) Yes   ( X ) No   (  ) Unknown- Reason: _____  IF YES PLEASE ANSWER THE FOLLOWING QUESTIONS:  14.	Which state/country have you been to? ______  15.	Were you there over 24 hours? (  ) Yes   (  ) No    (  ) Unknown- Reason: ______  16.	Date of return to [facility]: ______

## 2022-09-20 LAB
COVID-19 SPIKE DOMAIN AB INTERP: POSITIVE
COVID-19 SPIKE DOMAIN ANTIBODY RESULT: >250 U/ML — HIGH
CULTURE RESULTS: SIGNIFICANT CHANGE UP
SARS-COV-2 IGG+IGM SERPL QL IA: >250 U/ML — HIGH
SARS-COV-2 IGG+IGM SERPL QL IA: POSITIVE
SPECIMEN SOURCE: SIGNIFICANT CHANGE UP

## 2022-09-20 PROCEDURE — 99223 1ST HOSP IP/OBS HIGH 75: CPT

## 2022-09-20 RX ORDER — HYDROXYZINE HCL 10 MG
25 TABLET ORAL EVERY 6 HOURS
Refills: 0 | Status: DISCONTINUED | OUTPATIENT
Start: 2022-09-20 | End: 2022-09-23

## 2022-09-20 RX ORDER — ACETAMINOPHEN 500 MG
650 TABLET ORAL EVERY 6 HOURS
Refills: 0 | Status: DISCONTINUED | OUTPATIENT
Start: 2022-09-20 | End: 2022-09-23

## 2022-09-20 RX ADMIN — ESCITALOPRAM OXALATE 15 MILLIGRAM(S): 10 TABLET, FILM COATED ORAL at 08:26

## 2022-09-20 RX ADMIN — METFORMIN HYDROCHLORIDE 500 MILLIGRAM(S): 850 TABLET ORAL at 08:26

## 2022-09-20 RX ADMIN — Medication 650 MILLIGRAM(S): at 17:26

## 2022-09-20 RX ADMIN — METFORMIN HYDROCHLORIDE 500 MILLIGRAM(S): 850 TABLET ORAL at 20:36

## 2022-09-20 NOTE — DIETITIAN INITIAL EVALUATION ADULT - WEIGHT (KG)
Head, normocephalic, atraumatic, Face, Face within normal limits, Ears, External ears within normal limits
67.1

## 2022-09-20 NOTE — PSYCHIATRIC REHAB INITIAL EVALUATION - NSBHPRRECOMMEND_PSY_ALL_CORE
English Writer met with Pt to orient her to the unit, to Psych Rehab department and its functions. Pt was receptive. Pt was seen in the outdoor area of the unit. Pt was calm, pleasant, cooperative and forthcoming during the session. Pt is a 22 year old who identifies as a  female student. PT is attending [employer]. Pt lives with her parents in a private house. 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 Pt identified her primary reason of hospitalization as an SA via hanging herself post her break up. Pt reports that she did not take her meds, found out that financial is not paying her tuition fees and had a break up with her boyfriend. Pt reports that that SA via hanging was cry for help. PT denies SI/HI/AH/VH.   Per the chart Pt is with history of depression, anxiety and PTSD. no prior psychiatric hospitalizations, reporting 4 prior suicide attempts via hanging, recreational cannabis user (edibles), no history of violence, PMH DM2, brought in by ex-boyfriend for worsening depression and suicidality.  Writer and patient established a collaborative treatment goal for the patient to work on over the next 7 days. Writer encouraged the Pt to attend Psych rehab groups. Pt was receptive. Psychiatric rehabilitation staff will provide encouragement, support, psychotherapy, and psychoeducation to assist the patient in the progression of her treatment goal.
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
## 2022-09-20 NOTE — BH INPATIENT PSYCHIATRY ASSESSMENT NOTE - HPI (INCLUDE ILLNESS QUALITY, SEVERITY, DURATION, TIMING, CONTEXT, MODIFYING FACTORS, ASSOCIATED SIGNS AND SYMPTOMS)
22F, domiciled in apartment in mom's house, employed at a , student at [employer], with a reported history of depression, anxiety and ptsd, no prior psychiatric hospitalizations, reporting 4 prior suicide attempts via hanging, recreational cannabis user (edibles), no history of violence, PMH DM2, brought in by ex-boyfriend for worsening depression and suicidality.    Patient states "I attempted yesterday" and describes attempting to hang self but "did not jump". A friend came over and discovered the scarf around the neck. Patient states she wrote a suicide note but had no real intention of completing suicide and admits to using the attempt as a cry for help and attention. Reports the attempt came after having a 'mental break' following 2 major stressors this weekend: discovering her financial aid for school fell through and breaking up with boyfriend. Patient states life is a "[place] of sh*t". Notes there are times things are going well, she feels really hopeful, and other times feels "what is the point?", stops taking meds. Describes mood as "inconsistent" and notes extreme emotional reactivity to stressors. Admits to having issues with anger which has at times affected her relationships and difficulty with feelings of abandonment. Patient endorses previous instances of SI since adolescence however she has no history of self-injurious behavior and has not been hospitalized before.      Reports lexapro is prescribed by Dr. Gale, whom she sees every 3-6 months for the past year. Currently poor adherence to Lexapro. Prior trial of Zoloft which pt did not like. No other med trials. Denies prior history of excess energy, decreased need for sleep, hypersexuality, excess spending, talkativeness.    22F, domiciled in apartment in mom's house, employed at a , student at [employer], with a reported history of depression, anxiety and ptsd, no prior psychiatric hospitalizations, reporting 4 prior suicide attempts via hanging, recreational cannabis user (edibles), no history of violence, [facility] DM2, brought in by ex-boyfriend for worsening depression and suicidality.    Patient states "I attempted yesterday" and describes attempting to hang self but "did not jump". A friend came over and discovered the scarf around the neck. Patient states she wrote a suicide note but had no real intention of completing suicide and admits to using the attempt as a cry for help and attention. Reports the attempt came after having a 'mental break' following 2 major stressors this weekend: discovering her financial aid for school fell through and breaking up with boyfriend. Patient states life is a "[place] of sh*t". Notes there are times things are going well, she feels really hopeful, and other times feels "what is the point?", stops taking meds. Describes mood as "inconsistent" and notes extreme emotional reactivity to stressors. Admits to having issues with anger which has at times affected her relationships and at times struggles with difficulty of feelings of abandonment by people in her life. Patient endorses previous instances of SI since adolescence however she has no history of self-injurious behavior and has not been hospitalized previously.     Reports lexapro is prescribed by Dr. Gale, whom she sees every 3-6 months for the past year. Currently poor adherence to Lexapro. Prior trial of Zoloft which pt did not like. No other med trials. Denies prior history of excess energy, decreased need for sleep, hypersexuality, excess spending, talkativeness. Currently she is motivated to get better and open to attending [facility] clinic for group therapy.     Per Pt. mother [phone]: Has psych and therapist she's been working with for a while, did not think therapist was addressing issue that was supposed to be addressed. Believes pt needs help and pt has finally realized the medication she has been received is inadequate. Mother is aware she was thinking about taking life. Believes her current mental state can be attributed to their financial situation at home, mother says they do not have a lot of money and at times there is food insecurity, also pt trying to go to school and mother didn't have money to send her. Believes pt was being treated for the wrong diagnosis. Mother says she has not seen pt get angry thought at times pt is frustrated but it is nothing out of the ordinary for a young person. States pt has life stressors that lead to mood changes at times. Her relationship with daughter is supportive however she says she believes at times pt would like another mother and they argued a lot when she was younger and would not agree on things. Mother says she feels boyfriend is supportive but does not know much about him or their relationship. Mother is supportive of patient returning home and following up with therapy after discharge.    22F, domiciled in apartment in mom's house, employed at a , student at [employer], with a reported history of depression, anxiety and ptsd, no prior psychiatric hospitalizations, reporting 4 prior suicide attempts via hanging, recreational cannabis user (edibles), no history of violence, [facility] DM2, brought in by ex-boyfriend for worsening depression and suicidality.    Patient states "I attempted yesterday" and describes attempting to hang self but "did not jump". A friend came over and discovered a scarf around the neck (though during interview today pt reports that scarf was not around her neck but rather tied to a pipe). Patient states she wrote a suicide note but had no real intention of completing suicide and admits to using the attempt as a cry for help and attention. Reports the attempt came after having a 'mental break' following 2 major stressors this weekend: discovering her financial aid for school fell through and breaking up with boyfriend. Patient states life is a "[place] of sh*t". Notes there are times things are going well, she feels really hopeful, and other times feels "what is the point?", stops taking meds. Describes mood as "inconsistent" and notes extreme emotional reactivity to stressors. Admits to having issues with anger which has at times affected her relationships and at times struggles with difficulty of feelings of abandonment by people in her life. Patient endorses previous instances of SI since adolescence however she has no history of self-injurious behavior and has not been hospitalized previously.     Reports lexapro is prescribed by Dr. Gale, whom she sees every 3-6 months for the past year. Currently poor adherence to Lexapro. Prior trial of Zoloft which pt did not like. No other med trials. Denies prior history of excess energy, decreased need for sleep, hypersexuality, excess spending, talkativeness. Currently she is motivated to get better and open to attending [facility] clinic for group therapy.     Per Pt. mother [phone]: Has psych and therapist she's been working with for a while, did not think therapist was addressing issue that was supposed to be addressed. Believes pt needs help and pt has finally realized the medication she has been received is inadequate. Mother is aware she was thinking about taking life. Believes her current mental state can be attributed to their financial situation at home, mother says they do not have a lot of money and at times there is food insecurity, also pt trying to go to school and mother didn't have money to send her. Believes pt was being treated for the wrong diagnosis. Mother says she has not seen pt get angry thought at times pt is frustrated but it is nothing out of the ordinary for a young person. States pt has life stressors that lead to mood changes at times. Her relationship with daughter is supportive however she says she believes at times pt would like another mother and they argued a lot when she was younger and would not agree on things. Mother says she feels boyfriend is supportive but does not know much about him or their relationship. Mother is supportive of patient returning home and following up with therapy after discharge.

## 2022-09-20 NOTE — DIETITIAN INITIAL EVALUATION ADULT - OTHER INFO
Patient admitted to [facility] for worsening depression and suicidality; hx depression, anxiety, PTSD, DM2. Patient states "My appetite is coming back". Reports she had decreased po intake for 1-2 days PTA. Denies any GI distress. States is allergic to eggs but can have it if in cooked foods ie. pancakes, cake except for Vietnamese toast.  Reports losing weight (intentional)- was 160's #. Patient reports is 66". Discussed consistent carbohydrate meal plan with diet materials given to patient. Patient states she exercises every day.

## 2022-09-20 NOTE — PSYCHIATRIC REHAB INITIAL EVALUATION - NSBHEDUCURGRADE_PSY_ALL_CORE
student at [employer]. Pt reports that last week she found out that [employer] is not paying and required to drop out her classes this semester./2 year college

## 2022-09-20 NOTE — BH INPATIENT PSYCHIATRY ASSESSMENT NOTE - NSBHCHARTREVIEWVS_PSY_A_CORE FT
Vital Signs Last 24 Hrs  T(C): 36.7 (09-20-22 @ 08:42), Max: 36.8 (09-19-22 @ 17:48)  T(F): 98.1 (09-20-22 @ 08:42), Max: 98.3 (09-19-22 @ 19:21)  HR: 86 (09-19-22 @ 17:48) (86 - 86)  BP: 126/89 (09-19-22 @ 17:48) (126/89 - 126/89)  BP(mean): --  RR: 16 (09-19-22 @ 17:48) (16 - 16)  SpO2: 100% (09-19-22 @ 17:48) (100% - 100%)    Orthostatic VS  09-20-22 @ 08:42  Lying BP: --/-- HR: --  Sitting BP: 112/73 HR: 81  Standing BP: 101/67 HR: 90  Site: --  Mode: --  Orthostatic VS  09-19-22 @ 19:21  Lying BP: --/-- HR: --  Sitting BP: 137/88 HR: 90  Standing BP: 128/87 HR: 100  Site: --  Mode: --

## 2022-09-20 NOTE — PSYCHIATRIC REHAB INITIAL EVALUATION - NSPROGENSOURCEINFO_GEN_A_NUR
08/23/21      Baylee Lin  [address] [place], [place] [address] [address]    Dear Baylee,    We look forward to seeing you on 10/28/2021 arrival 8:00am  for your procedure at 9:00am     To better prepare you, please observe the following:     Preparing for GI Procedure  ? There is no need to schedule an appointment with your primary care physician      You will receive a call from our Pre-Admission Testing department prior to your surgical procedure. This call is necessary to get important information about your health history, to ensure you are properly prepared for surgery, and minimize the chance of having your procedure delayed or rescheduled.    WHAT TO DO ABOUT YOUR PRESCRIPTION MEDICATIONS  You must continue taking all your previously prescribed medications as directed unless specifically told not to by your doctor, or if you find them on the list below    STOP THESE MEDICATIONS   • Aspirin stop 3 days prior unless instructed otherwise by your doctor or Cardiologist.   • Erectile dysfunction medications stop 2 days prior    • Herbal medications/ Vitamins/ Fish Oil stop 7 days prior unless otherwise directed by your doctor.    • Phentermine stop 7 days prior   • Selegiline patches stop 21 days prior   Your doctor will have given you instructions about modifying your Insulin regimen 1 day prior.  If on a blood thinner, ask your doctor, Cardiologist and/or surgeon if and when it should be stopped.    You will be contacted by phone or email by a company called [employer]. This is an online educational platform that will provide education regarding your procedure. Please take the time to review this video.    Day before Procedure  Please follow any specific instructions given to you regarding any prep needed related to your procedure.    Day of Procedure  Take your normal morning medications with a sip of water first thing in the morning prior to surgery, except those medications your doctor or our Pre-Admission Testing  office has specifically told you not to take.    Other than a sip of water to wash down medications, DO NOT PUT ANYTHING IN YOUR MOUTH FOR AT LEAST FOUR HOURS PRIOR TO YOUR PROCEDURE.  This includes gum, candy, cigarettes, and chewing tobacco.       If you choose, for your convenience we have an outpatient pharmacy on-site and can arrange to have your discharge prescription filled before you leave, to save you a trip. You may want to bring enough money to cover the cost of this prescription.      Each patient that comes through the GI department is given individualized care and attention. The doctors and nurses spend the time necessary with each patient to ensure the best care. Sometimes, this can cause delays. You may want to bring a book, puzzle or music player to keep you busy if you experience a delay.       Procedures usually involve giving medications that put you to sleep or ease anxiety. For this reason, you must have a ride home from the hospital and someone (over age 16) should stay with you for the first 24 hours after your procedure. The procedure will be canceled if you do not have a responsible person with you. You are not allowed to drive after receiving sedation of any type.     Visitor Restrictions are currently in place due to COVID-19.  You can bring one designated adult (18 yr or older) with you to your surgery. Additional COVID related restrictions may apply.     Bring a photo ID, your insurance card and any copay due at time of service. Leave all valuables at home. Do not wear jewelry, makeup, body lotion, or fingernail polish.        If you have any questions or concerns before the day of your procedure, please call [phone] to have them answered.     After your procedure you may get a survey via email or text message. Please take the opportunity to fill it out letting us know what we did well or what we could improve on. Our goal is to provide our patients with the best possible care and  we couldn’t do it without your input.     We look forward to seeing you,  Your GI Services Team   patient/health record

## 2022-09-20 NOTE — BH INPATIENT PSYCHIATRY ASSESSMENT NOTE - MSE UNSTRUCTURED FT
Pt is a 21yo woman, with good hygiene and grooming, interviewed while seated outside. The patient is cooperative on interview. Fair eye contact. No psychomotor agitation or retardation noted. The patient's speech was fluent, normal in tone, regular rate and volume. The patient's mood is anxious.  Affect is euthymic TP is linear.  Thought content: . Denies SIIP and HIIP. Insight is fair. Judgement is fair. Impulse control is intact.    Pt is a 21yo woman, with good hygiene and grooming, interviewed while seated outside. The patient is cooperative on interview. Fair eye contact. No psychomotor agitation or retardation noted. The patient's speech was fluent, normal in tone, regular rate and volume. The patient's mood is slightly anxious.  Affect is euthymic.  Thought content: Pt is hopeful and motivated to attend therapy. Denies SIIP and HIIP. Insight is good. Judgement is fair. Impulse control is intact.    Pt is a 23yo woman, with good hygiene and grooming, interviewed while seated outside. The patient is cooperative on interview. Fair eye contact. No psychomotor agitation or retardation noted. The patient's speech was fluent, normal in tone, regular rate and volume. The patient's mood is "ok."  Affect is euthymic, bright, full, often laughing and joking with treatment team.  Thought content: Pt is hopeful and motivated to attend therapy. Denies SIIP and HIIP. No evidence of delusions. No evidence of perceptual disturbances. Insight is fair. Judgement is fair. Impulse control is intact at this time.

## 2022-09-20 NOTE — BH INPATIENT PSYCHIATRY ASSESSMENT NOTE - NSBHATTESTCOMMENTATTENDFT_PSY_A_CORE
I have personally seen and examined this patient.  I fully participated in the care of this patient.  I have made amendments to the documentation where appropriate and otherwise agree with the history, physical exam, and plan as documented by the Student.

## 2022-09-20 NOTE — DIETITIAN INITIAL EVALUATION ADULT - PERTINENT MEDS FT
MEDICATIONS  (STANDING):  escitalopram 15 milliGRAM(s) Oral daily  metFORMIN 500 milliGRAM(s) Oral two times a day    MEDICATIONS  (PRN):  LORazepam     Tablet 2 milliGRAM(s) Oral every 6 hours PRN Anxiety  LORazepam   Injectable 2 milliGRAM(s) IntraMuscular once PRN Agitation

## 2022-09-20 NOTE — BH INPATIENT PSYCHIATRY ASSESSMENT NOTE - DESCRIPTION
lives in 2 family house with mom. 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. recently ended a romantic relationship

## 2022-09-20 NOTE — BH INPATIENT PSYCHIATRY ASSESSMENT NOTE - NSBHASSESSSUMMFT_PSY_ALL_CORE
Pt is a 21yo woman who lives with her mother, student at [employer], with PMHx T2DM, and PPHx depression, no prior hospitalizations, hx of several prior suicide attempts in the distant past, currently in outpt treatment with psychiatrist and therapist (prescribed lexpro for past 6 years), presenting after aborted suicide attempt related to stressors of losing financial aid for college and break-up with boyfriend. On further evaluation pt adamantly denies suicidal intent and report that planned hanging was a means to get attention, help, and support from loved ones. Pt reports symptoms of affective instability, unstable interpersonal relationships, fear of abandonment, poor sense of self, transient paranoid ideation, and episodes on intense anger; such symptoms are most consistent with borderline personality disorder. Psychoeducation provided to pt, which she found helpful. Pt now reports improved mood and denies SIIP, feeling hopeful about the future and motivated to engage in treatment.     Plan:  1. Admit to [facility], voluntary legal status  2. Routine checks  3. C/w Lexapro 15mg daily, no indication for med changes at this time  4. Admission labs and EKG reviewed, within normal limits  5. C/w Metformin for T2DM  6. Will expand collateral from pt's mother and outpt psychiatrist

## 2022-09-20 NOTE — BH INPATIENT PSYCHIATRY ASSESSMENT NOTE - RISK ASSESSMENT
acute risks include depressive episode, recent suicide attempt, educational and relationship stressors, poor medication adherence. chronic risks include family history of bipolar, history of trauma, prior suicide attempts/gestures as an adolescent. protective factors include help seeking behavior, employed, stable domicile, no history of violence, no known access to weapons, no current SI/HI. MODERATE IMMINENT risk to self requiring inpatient level of care as above.

## 2022-09-20 NOTE — BH INPATIENT PSYCHIATRY ASSESSMENT NOTE - NSSUICPROTFACT_PSY_ALL_CORE
Engaged in work or school Responsibility to children, family, or others/Identifies reasons for living/Supportive social network of family or friends/Engaged in work or school/Positive therapeutic relationships

## 2022-09-20 NOTE — BH INPATIENT PSYCHIATRY ASSESSMENT NOTE - CURRENT MEDICATION
MEDICATIONS  (STANDING):  escitalopram 15 milliGRAM(s) Oral daily  metFORMIN 500 milliGRAM(s) Oral two times a day    MEDICATIONS  (PRN):  LORazepam     Tablet 2 milliGRAM(s) Oral every 6 hours PRN Anxiety  LORazepam   Injectable 2 milliGRAM(s) IntraMuscular once PRN Agitation   MEDICATIONS  (STANDING):  escitalopram 15 milliGRAM(s) Oral daily  metFORMIN 500 milliGRAM(s) Oral two times a day    MEDICATIONS  (PRN):  hydrOXYzine hydrochloride 25 milliGRAM(s) Oral every 6 hours PRN anxiety  LORazepam   Injectable 2 milliGRAM(s) IntraMuscular once PRN Agitation

## 2022-09-20 NOTE — BH INPATIENT PSYCHIATRY ASSESSMENT NOTE - NSBHMETABOLIC_PSY_ALL_CORE_FT
BMI:   HbA1c:   Glucose: POCT Blood Glucose.: 250 mg/dL (09-19-22 @ 14:12)    BP: 126/89 (09-19-22 @ 17:48) (126/89 - 128/78)  Lipid Panel:

## 2022-09-20 NOTE — PSYCHIATRIC REHAB INITIAL EVALUATION - NSBHALCSUBTREAT_PSY_ALL_CORE
Dr. Gale, whom she sees every 3-6 months for the past year.  Therapist/ 1x a week/Outpatient clinic (specify)

## 2022-09-20 NOTE — BH INPATIENT PSYCHIATRY ASSESSMENT NOTE - NSBHCONSULTIPREASON_PSY_A_CORE
Attending note. Patient is alert and in no acute distress. Patient has a normal voice. Oropharynx and airway is normal. There is no stridor. Lungs are clear and equal bilaterally without wheezes rales or rhonchi. Heart regular rate and rhythm without murmur. Examination of the skin shows hives on the thorax and lower extremity. The palms are spared.
danger to self; mental illness expected to respond to inpatient care

## 2022-09-20 NOTE — DIETITIAN INITIAL EVALUATION ADULT - PERTINENT LABORATORY DATA
09-19    141  |  103  |  6<L>  ----------------------------<  273<H>  3.8   |  23  |  0.49<L>    Ca    9.5      19 Sep 2022 14:30    TPro  7.8  /  Alb  4.5  /  TBili  0.3  /  DBili  x   /  AST  18  /  ALT  17  /  AlkPhos  44  09-19  POCT Blood Glucose.: 250 mg/dL (09-19-22 @ 14:12)

## 2022-09-21 PROCEDURE — 99232 SBSQ HOSP IP/OBS MODERATE 35: CPT

## 2022-09-21 RX ADMIN — METFORMIN HYDROCHLORIDE 500 MILLIGRAM(S): 850 TABLET ORAL at 20:33

## 2022-09-21 RX ADMIN — ESCITALOPRAM OXALATE 15 MILLIGRAM(S): 10 TABLET, FILM COATED ORAL at 08:15

## 2022-09-21 RX ADMIN — METFORMIN HYDROCHLORIDE 500 MILLIGRAM(S): 850 TABLET ORAL at 08:15

## 2022-09-21 NOTE — BH INPATIENT PSYCHIATRY PROGRESS NOTE - MSE UNSTRUCTURED FT
Pt is a 23yo woman, with good hygiene and grooming, interviewed while seated outside. The patient is cooperative on interview. Fair eye contact. No psychomotor agitation or retardation noted. The patient's speech was fluent, normal in tone, regular rate and volume. The patient's mood is "ok."  Affect is euthymic, bright, full, often laughing and joking with treatment team.  Thought content: Pt is hopeful and motivated to attend therapy. Denies SIIP and HIIP. No evidence of delusions. No evidence of perceptual disturbances. Insight is fair. Judgement is fair. Impulse control is intact at this time.

## 2022-09-21 NOTE — BH INPATIENT PSYCHIATRY PROGRESS NOTE - NSBHFUPINTERVALHXFT_PSY_A_CORE
No acute events overnight. Pt eating and sleeping well. States mood is fine. She has a headache that comes and goes but believes it is mainly due to not drinking enough water, Tylenol has helped manage these headaches. States that her mood has greatly improved and she wants to live, she is motivated to attend outpatient treatment and take her medications regularly with a timed schedule and help from her boyfriend. Pt says that financial hardship is a continuous stressor in her life and she feels some feelings of anxiety about the uncertainty of finances when she returns home, she currently holds a job however it is part-time and not enough to fully support herself, says she will no longer have three meals a day as she currently does in the hospital when she leaves here. States her hospital stay has made a difference in her future plans. Pt denies SIIP and HIIP.  No acute events overnight. Pt eating and sleeping well. States mood is much better. She has a headache that comes and goes but believes it is mainly due to not drinking enough water, Tylenol has helped manage these headaches. States that her mood has greatly improved and she wants to live, she is motivated to develop better sleep habits and take her medications regularly with a timed schedule and help from her boyfriend. Pt says that financial hardship is a continuous stressor in her life and she feels some feelings of anxiety about the uncertainty of finances when she returns home, she currently holds a job however it is part-time and not enough to fully support herself, says she will no longer have three meals a day as she currently does in the hospital when she leaves here. States her hospital stay has made a difference in her future planning and she is motivated to attend outpatient therapy. Pt denies SIIP and HIIP.

## 2022-09-21 NOTE — BH SOCIAL WORK INITIAL PSYCHOSOCIAL EVALUATION - OTHER PAST PSYCHIATRIC HISTORY (INCLUDE DETAILS REGARDING ONSET, COURSE OF ILLNESS, INPATIENT/OUTPATIENT TREATMENT)
EMR reports Pt is a "22F, domiciled in apartment in mom's house, employed at a , student at [employer], with a reported history of depression, anxiety and ptsd, no prior psychiatric hospitalizations, reporting 4 prior suicide attempts via hanging, recreational cannabis user (edibles), no history of violence, PMH DM2, brought in by ex-boyfriend for worsening depression and suicidality."

## 2022-09-21 NOTE — BH INPATIENT PSYCHIATRY PROGRESS NOTE - NSBHASSESSSUMMFT_PSY_ALL_CORE
Pt is a 23yo woman who lives with her mother, student at [employer], with PMHx T2DM, and PPHx depression, no prior hospitalizations, hx of several prior suicide attempts in the distant past, currently in outpt treatment with psychiatrist and therapist (prescribed lexpro for past 6 years), presenting after aborted suicide attempt related to stressors of losing financial aid for college and break-up with boyfriend. On further evaluation pt adamantly denies suicidal intent and report that planned hanging was a means to get attention, help, and support from loved ones. Pt reports symptoms of affective instability, unstable interpersonal relationships, fear of abandonment, poor sense of self, transient paranoid ideation, and episodes on intense anger; such symptoms are most consistent with borderline personality disorder. Psychoeducation provided to pt, which she found helpful. Pt now reports improved mood and denies SIIP, feeling hopeful about the future and motivated to engage in treatment.     Plan:  1. Admit to [facility], voluntary legal status  2. Routine checks  3. C/w Lexapro 15mg daily, no indication for med changes at this time  4. Admission labs and EKG reviewed, within normal limits  5. C/w Metformin for T2DM  6. Will expand collateral from pt's mother and outpt psychiatrist Pt is a 23yo woman who lives with her mother, student at [employer], with PMHx T2DM, and PPHx depression, no prior hospitalizations, hx of several prior suicide attempts in the distant past, currently in outpt treatment with psychiatrist and therapist (prescribed lexpro for past 6 years), presenting after aborted suicide attempt related to stressors of losing financial aid for college and break-up with boyfriend. On further evaluation pt adamantly denies suicidal intent and report that planned hanging was a means to get attention, help, and support from loved ones. Pt reports symptoms of affective instability, unstable interpersonal relationships, fear of abandonment, poor sense of self, transient paranoid ideation, and episodes on intense anger; such symptoms are most consistent with borderline personality disorder. Psychoeducation provided to pt, which she found helpful.     Pt continues to report improved mood and denies SIIP, feeling hopeful about the future and motivated to engage in treatment. Monitoring for sustained improvement.     Plan: c/w Lexapro 15mg daily.

## 2022-09-22 PROCEDURE — 90853 GROUP PSYCHOTHERAPY: CPT

## 2022-09-22 RX ORDER — ESCITALOPRAM OXALATE 10 MG/1
3 TABLET, FILM COATED ORAL
Qty: 90 | Refills: 0
Start: 2022-09-22 | End: 2022-10-21

## 2022-09-22 RX ORDER — METFORMIN HYDROCHLORIDE 850 MG/1
1 TABLET ORAL
Qty: 0 | Refills: 0 | DISCHARGE
Start: 2022-09-22

## 2022-09-22 RX ORDER — ESCITALOPRAM OXALATE 10 MG/1
15 TABLET, FILM COATED ORAL
Qty: 0 | Refills: 0 | DISCHARGE

## 2022-09-22 RX ADMIN — METFORMIN HYDROCHLORIDE 500 MILLIGRAM(S): 850 TABLET ORAL at 20:49

## 2022-09-22 RX ADMIN — ESCITALOPRAM OXALATE 15 MILLIGRAM(S): 10 TABLET, FILM COATED ORAL at 08:20

## 2022-09-22 RX ADMIN — METFORMIN HYDROCHLORIDE 500 MILLIGRAM(S): 850 TABLET ORAL at 08:19

## 2022-09-22 NOTE — BH INPATIENT PSYCHIATRY DISCHARGE NOTE - HPI (INCLUDE ILLNESS QUALITY, SEVERITY, DURATION, TIMING, CONTEXT, MODIFYING FACTORS, ASSOCIATED SIGNS AND SYMPTOMS)
22F, domiciled in apartment in mom's house, employed at a , student at [employer], with a reported history of depression, anxiety and ptsd, no prior psychiatric hospitalizations, reporting 4 prior suicide attempts via hanging, recreational cannabis user (edibles), no history of violence, [facility] DM2, brought in by ex-boyfriend for worsening depression and suicidality.    Patient states "I attempted yesterday" and describes attempting to hang self but "did not jump". A friend came over and discovered a scarf around the neck (though during interview today pt reports that scarf was not around her neck but rather tied to a pipe). Patient states she wrote a suicide note but had no real intention of completing suicide and admits to using the attempt as a cry for help and attention. Reports the attempt came after having a 'mental break' following 2 major stressors this weekend: discovering her financial aid for school fell through and breaking up with boyfriend. Patient states life is a "[place] of sh*t". Notes there are times things are going well, she feels really hopeful, and other times feels "what is the point?", stops taking meds. Describes mood as "inconsistent" and notes extreme emotional reactivity to stressors. Admits to having issues with anger which has at times affected her relationships and at times struggles with difficulty of feelings of abandonment by people in her life. Patient endorses previous instances of SI since adolescence however she has no history of self-injurious behavior and has not been hospitalized previously.     Reports lexapro is prescribed by Dr. Gale, whom she sees every 3-6 months for the past year. Currently poor adherence to Lexapro. Prior trial of Zoloft which pt did not like. No other med trials. Denies prior history of excess energy, decreased need for sleep, hypersexuality, excess spending, talkativeness. Currently she is motivated to get better and open to attending [facility] clinic for group therapy.     Per Pt. mother [phone]: Has psych and therapist she's been working with for a while, did not think therapist was addressing issue that was supposed to be addressed. Believes pt needs help and pt has finally realized the medication she has been received is inadequate. Mother is aware she was thinking about taking life. Believes her current mental state can be attributed to their financial situation at home, mother says they do not have a lot of money and at times there is food insecurity, also pt trying to go to school and mother didn't have money to send her. Believes pt was being treated for the wrong diagnosis. Mother says she has not seen pt get angry thought at times pt is frustrated but it is nothing out of the ordinary for a young person. States pt has life stressors that lead to mood changes at times. Her relationship with daughter is supportive however she says she believes at times pt would like another mother and they argued a lot when she was younger and would not agree on things. Mother says she feels boyfriend is supportive but does not know much about him or their relationship. Mother is supportive of patient returning home and following up with therapy after discharge.

## 2022-09-22 NOTE — BH INPATIENT PSYCHIATRY DISCHARGE NOTE - ATTENDING DISCHARGE PHYSICAL EXAMINATION:
Pt is a 23yo woman, with good hygiene and grooming. The patient is cooperative on interview with good eye contact. No psychomotor agitation or retardation noted. The patient's speech was fluent, normal in tone, regular rate and volume. The patient's mood is "good."  Affect is euthymic, bright, full. Thought content: Pt is hopeful and motivated to attend therapy. Denies SIIP and HIIP. No evidence of delusions. No evidence of perceptual disturbances. Insight is fair. Judgement is fair. Impulse control is intact at this time.

## 2022-09-22 NOTE — BH INPATIENT PSYCHIATRY DISCHARGE NOTE - NSBHDCHANDOFFFT_PSY_ALL_CORE
secure email sent to  secure email sent to Dr. Birmingham with HPI, hospital course, and current meds.

## 2022-09-22 NOTE — BH INPATIENT PSYCHIATRY DISCHARGE NOTE - HOSPITAL COURSE
Pt is a 23yo woman who lives with her mother, student at [employer], with PMHx T2DM, and PPHx depression, no prior hospitalizations, hx of several prior suicide attempts in the distant past, currently in outpt treatment with psychiatrist and therapist (prescribed lexpro for past 6 years), presenting after aborted suicide attempt via hanging in the context of argument with boyfriend and loss of financial aid for school. On admission pt endorsed pattern/history of affective instability, intense episodes of anger, unstable interpersonal relationships, fears of abandonment, poor sense of self, and transient paranoid ideation, consistent with borderline personality disorder. Psychoeducation was provided to pt about this diagnosis, which she was receptive to.     Pt exhibited rapid improvement in mood and given this, along with primary dx personality pathology, no medication changes were made (pt continued on home med Lexapro 15mg daily). During hospitalization pt consistently denied SIIP and reported that attempt prior to hospitalization was a means to get attention and support from loved ones (adamantly denied having any suicidal intent). Pt slept well and exhibited good appetite and energy. She was in good behavioral control and never became aggressive or violent. She was cooperative and compliant with meds. She was at first reluctant to participate in group therapy, though ultimately did attend several groups, which she found helpful. She tended to ADLs appropriately. Pt's mother, boyfriend, and friend were involved in pt's treatment and very supportive (though pt did often discuss chronic conflict between her and her mother).     Pt was discharged with 30-day supply of Lexpro 15mg daily and plan to follow-up at [facility]. Pt is a 21yo woman who lives with her mother, student at [employer], with PMHx T2DM, and PPHx depression, no prior hospitalizations, hx of several prior suicide attempts in the distant past, currently in outpt treatment with psychiatrist and therapist (prescribed lexpro for past 6 years), presenting after aborted suicide attempt via hanging in the context of argument with boyfriend and loss of financial aid for school. On admission pt endorsed pattern/history of affective instability, intense episodes of anger, unstable interpersonal relationships, fears of abandonment, poor sense of self, and transient paranoid ideation, consistent with borderline personality disorder. Psychoeducation was provided to pt about this diagnosis, which she was receptive to.     Pt exhibited rapid improvement in mood and given this, along with primary dx personality pathology, no medication changes were made (pt continued on home med Lexapro 15mg daily). During hospitalization pt consistently denied SIIP and reported that attempt prior to hospitalization was a means to get attention and support from loved ones (adamantly denied having any suicidal intent). Pt slept well and exhibited good appetite and energy. She was in good behavioral control and never became aggressive or violent. She was cooperative and compliant with meds. She was at first reluctant to participate in group therapy, though ultimately did attend several groups, which she found helpful. She tended to ADLs appropriately. Pt's mother, boyfriend, and friend were involved in pt's treatment and very supportive (though pt did often discuss chronic conflict between her and her mother).     Pt was discharged with 30-day supply of Lexpro 15mg daily and plan to follow-up at [facility] program.

## 2022-09-22 NOTE — BH INPATIENT PSYCHIATRY DISCHARGE NOTE - NSDCCPCAREPLAN_GEN_ALL_CORE_FT
PRINCIPAL DISCHARGE DIAGNOSIS  Diagnosis: Borderline personality disorder  Assessment and Plan of Treatment:

## 2022-09-22 NOTE — BH INPATIENT PSYCHIATRY PROGRESS NOTE - NSBHFUPINTERVALHXFT_PSY_A_CORE
No acute events overnight. Pt eating and sleeping well. Denies medication side effects. States mood is good though irritable earlier in the day because she was unable to use the phone as groups were ongoing in the dayroom. Pt states she is not currently feeling depressed or down but does have some feelings of anxiety. Pt states she is attending some of the group therapy meetings and finds them helpful with socializing and processing her emotions, one of which is that she finds it difficult to open up socially in first-time meetings with people she wants to impress, she says that she often fears rejection from others when meeting new people and at times can be very hard on herself. Pt expresses that she would like to live with her boyfriend and his parents as the situation is overall healthier than her current home life. Pt says she found her father  in her family home in 2019 and there is an added reminder of her father's death whenever she returns home, states she is still grieving her father's death. She remains motivated to attend outpatient therapy when she leaves to target life stressors and create personal goals for herself. Denies SIIP and HIIP.  No acute events overnight. Pt eating and sleeping well. Denies medication side effects. States mood is good though irritable earlier in the day because she was unable to use the phone as groups were ongoing in the dayroom. Pt states she is not currently feeling depressed or down but does have some feelings of anxiety. Pt states she is attending some of the group therapy meetings and finds them helpful with socializing and processing her emotions, one of which is that she finds it difficult to open up socially in first-time meetings with people she wants to impress, she says that she often fears rejection from others when meeting new people and at times can be very hard on herself. Pt expresses that she would like to live with her boyfriend and his parents as the situation is overall healthier than her current home life. Pt says she found her father  in her family home in 2019 and there is an added reminder of her father's death whenever she returns home, states she is still grieving her father's death. She remains motivated to attend outpatient therapy in order to stressors and create personal goals for herself. Denies SIIP and HIIP.

## 2022-09-22 NOTE — BH INPATIENT PSYCHIATRY PROGRESS NOTE - NSBHASSESSSUMMFT_PSY_ALL_CORE
Pt is a 23yo woman who lives with her mother, student at [employer], with PMHx T2DM, and PPHx depression, no prior hospitalizations, hx of several prior suicide attempts in the distant past, currently in outpt treatment with psychiatrist and therapist (prescribed lexpro for past 6 years), presenting after aborted suicide attempt related to stressors of losing financial aid for college and break-up with boyfriend. On further evaluation pt adamantly denies suicidal intent and report that planned hanging was a means to get attention, help, and support from loved ones. Pt reports symptoms of affective instability, unstable interpersonal relationships, fear of abandonment, poor sense of self, transient paranoid ideation, and episodes on intense anger; such symptoms are most consistent with borderline personality disorder. Psychoeducation provided to pt, which she found helpful.     Pt continues to report improved mood and denies SIIP, feeling hopeful about the future and motivated to engage in treatment. Monitoring for sustained improvement.     Plan: c/w Lexapro 15mg daily.

## 2022-09-22 NOTE — BH INPATIENT PSYCHIATRY PROGRESS NOTE - MSE UNSTRUCTURED FT
Pt is a 21yo woman, with good hygiene and grooming, interviewed while seated in room. The patient is cooperative on interview. Fair eye contact. No psychomotor agitation or retardation noted. The patient's speech was fluent, normal in tone, regular rate and volume. The patient's mood is "ok."  Affect is euthymic, bright, full, often laughing and joking with treatment team.  Thought content: Pt is hopeful and motivated to attend therapy. Denies SIIP and HIIP. No evidence of delusions. No evidence of perceptual disturbances. Insight is fair. Judgement is fair. Impulse control is intact at this time.

## 2022-09-22 NOTE — BH INPATIENT PSYCHIATRY DISCHARGE NOTE - DESCRIPTION
lives in 2 family house with mom. 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 recently ended a romantic relationship

## 2022-09-22 NOTE — BH INPATIENT PSYCHIATRY DISCHARGE NOTE - NSBHSUICIDESTATUS_PSY_ALL_CORE
Pt remains a chronic high risk of harm to self due to hx of several prior suicide attempts and cluster b personality pathology. Acute risk factors include recent aborted suicide attempt in the context of stressors, now treated with inpatient hospitalization. Other protective factors that mitigate acute risks include pt has consistently denied SIIP during hospitalization, she has reflected that prior attempt was a means to get attention/help and was without suicidal intent, she is hopeful and future-oriented, she is treatment-seeking, she has strong social support of mother, boyfriend, and friends, she does not have access to a gun, and she is able to engage in safety planning. Thus, at present, patient is not an acute risk of harm to self or others, has achieved optimal benefits from hospitalization, and is appropriate for less restrictive level of care and discharge home. Pt agreeable to contact outpt providers, call 911, or go to the nearest ED with any imminent safety concerns for self or others.

## 2022-09-22 NOTE — BH INPATIENT PSYCHIATRY DISCHARGE NOTE - OTHER PAST PSYCHIATRIC HISTORY (INCLUDE DETAILS REGARDING ONSET, COURSE OF ILLNESS, INPATIENT/OUTPATIENT TREATMENT)
EMR reports Pt is a "22F, domiciled in apartment in mom's house, employed at a , student at [employer], with a reported history of depression, anxiety and ptsd, no prior psychiatric hospitalizations, reporting 4 prior suicide attempts via hanging, recreational cannabis user (edibles), no history of violence, PMH DM2, brought in by ex-boyfriend for worsening depression and suicidality."

## 2022-09-22 NOTE — BH TREATMENT PLAN - NSTXDCOPLKINTERSW_PSY_ALL_CORE
Pt would benefit from outpt services that include group services.  Pt would benefit from outpt services that includes DBT Tx.

## 2022-09-22 NOTE — BH INPATIENT PSYCHIATRY DISCHARGE NOTE - NSDCMRMEDTOKEN_GEN_ALL_CORE_FT
Lexapro 5 mg oral tablet: 3 tab(s) orally once a day  metFORMIN 500 mg oral tablet: 1 tab(s) orally 2 times a day

## 2022-09-22 NOTE — BH INPATIENT PSYCHIATRY DISCHARGE NOTE - NSACTIVEVENT_PSY_ALL_CORE
GENERAL SURGERY FOLLOWUP    5/10/2022       History of Present Illness: The patient is a 83 year old female with history of fecal incontinence and rectal prolapse s/p perineal proctectomy in December 2018, who presents with recurrent rectal prolapse.  She states that over the last 2 years, she has noticed recurrent prolapse from the anal verge, primarily after sitting on the toilet and occasionally after walking for extended periods of time. The prolapse has become progressively larger over the last year, although only extends a few inches from the anal verge and is not nearly as large as when she had her first surgery in 2018.  She has associated rectal bleeding and mucoid discharge.  She denies pain associated with the prolapse. She continues to have fecal incontinence. She has multiple solid stools daily, often when she is urinating without warning.  She passes solid stool into her Depends without warning every day. She denies difficulty passing stool.  She is no longer taking any anticoagulants except aspirin 81 mg daily.  She is scheduled for cataract surgery later in May.    Chief Complaint   Patient presents with   • Office Visit     would like to discuss prolapse surgery    Rectal prolapse      Past Medical History:   Diagnosis Date   • Arthritis    • Heart murmur    • Hypertension    • Internal hemorrhoid    • Lymphedema    • Rectal prolapse    • Tobacco non-user    • Urinary incontinence        Past Surgical History:   Procedure Laterality Date   • Aortic valve replacement     • Cardiac surgery  03/20/2019    TAVR   • Partial hip replacement     • Proctectomy  12/03/2018    Perineal proctectomy with posterior levatoroplasty   • Total hip replacement         Social History     Socioeconomic History   • Marital status: /Civil Union     Spouse name: Not on file   • Number of children: Not on file   • Years of education: Not on file   • Highest education level: Not on file   Occupational History   •  Occupation: Retired    Tobacco Use   • Smoking status: Never Smoker   • Smokeless tobacco: Never Used   Vaping Use   • Vaping Use: never used   Substance and Sexual Activity   • Alcohol use: Not Currently   • Drug use: Never   • Sexual activity: Not Currently   Other Topics Concern   • Not on file   Social History Narrative    Occupation: Retired             Social Determinants of Health     Financial Resource Strain: Not on file   Food Insecurity: Not on file   Transportation Needs: Not on file   Physical Activity: Not on file   Stress: Not on file   Social Connections: Not on file   Intimate Partner Violence: Not on file       Family History   Problem Relation Age of Onset   • Cancer, Prostate Father        Current Outpatient Medications   Medication Sig Dispense Refill   • enalapril (VASOTEC) 2.5 MG tablet Take 1 tablet by mouth daily.     • furosemide (LASIX) 20 MG tablet Take 20 mg by mouth.     • aspirin 81 MG tablet Take 81 mg by mouth daily.     • Clopidogrel Bisulfate (PLAVIX PO)      • enalapril (VASOTEC) 5 MG tablet Take 5 mg by mouth daily.       No current facility-administered medications for this visit.       ALLERGIES:  No Known Allergies      PHYSICAL EXAM    Physical Exam  Constitutional:       General: She is not in acute distress.     Appearance: She is well-developed.   HENT:      Head: Normocephalic and atraumatic.      Neck: Normal range of motion and neck supple.   Eyes:      Conjunctiva/sclera: Conjunctivae normal.      Pupils: Pupils are equal, round, and reactive to light.   Neck:      Thyroid: No thyromegaly.   Cardiovascular:      Rate and Rhythm: Normal rate and regular rhythm.   Pulmonary:      Effort: Pulmonary effort is normal. No respiratory distress.      Breath sounds: Normal breath sounds. No wheezing or rales.   Abdominal:      General: There is no distension.      Palpations: Abdomen is soft.      Tenderness: There is no abdominal tenderness.   Genitourinary:      Comments: Multiple solid light brown stools in the Depends. Anus is partially gaping at rest, no visible prolapse initially. Digital rectal exam reveals diminished resting sphincter tone and minimal squeeze sphincter tone, primarily with gluteal squeeze, no gross blood, no palpable masses, soft brown stool in vault.  External inspection during Valsalva reveals full thickness rectal prolapse extending 2-3 from the anal verge, easily reducible, nontender    Musculoskeletal:         General: Swelling present. No deformity. Normal range of motion.      Comments: Significant lymphedema in the bilateral lower extremities   Skin:     General: Skin is warm and dry.      Findings: No rash.   Neurological:      Mental Status: She is alert and oriented to person, place, and time.      Coordination: Coordination normal.   Psychiatric:         Behavior: Behavior normal.         Thought Content: Thought content normal.         Judgment: Judgment normal.              ASSESSMENT/ PLAN:  The patient is a 83 year old female with history of fecal incontinence and rectal prolapse s/p perineal proctectomy in December 2018, who presents with recurrent rectal prolapse.  I reviewed the pathogenesis and natural history of rectal prolapse and fecal incontinence, including their associated with pelvic floor dysfunction and sphincter weakness.  She has developed a mild recurrent rectal prolapse compared to her initial condition. Her fecal incontinence is unchanged, which I reinforced does not typically resolve with repair of the prolapse. Although it is small, she is symptomatic from the recurrent prolapse and desires to have it removed. I recommended perineal proctectomy with posterior levatoroplasty.  Details of the surgical procedure were discussed, including postop recovery and expectations. Risks, benefits, indication and alternatives to surgery were discussed. Risks reviewed include bleeding, infection, anastomotic leak, damage to  surrounding structures, recurrent prolapse, and reoperation. She expressed understanding and desires to proceed with surgery. Schedule for OR. She plans to schedule repair of her prolapse following cataract surgery. She was recently seen by her cardiologist for preop before cataract surgery and was cleared without issue.  She will see her PCP for preop evaluation and labs. I reinforced the importance of avoiding straining and prolonged toilet time to prevent exacerbation of her prolapse.  All questions answered.       Yris Juarez MD     Triggering events leading to humiliation, shame, and/or despair (e.g., Loss of relationship, financial or health status) (real or anticipated)

## 2022-09-23 VITALS — TEMPERATURE: 98 F

## 2022-09-23 PROBLEM — F32.9 MAJOR DEPRESSIVE DISORDER, SINGLE EPISODE, UNSPECIFIED: Chronic | Status: ACTIVE | Noted: 2022-09-19

## 2022-09-23 RX ADMIN — METFORMIN HYDROCHLORIDE 500 MILLIGRAM(S): 850 TABLET ORAL at 08:28

## 2022-09-23 RX ADMIN — ESCITALOPRAM OXALATE 15 MILLIGRAM(S): 10 TABLET, FILM COATED ORAL at 08:28

## 2022-09-23 NOTE — BH PSYCHOLOGY - GROUP THERAPY NOTE - NSPSYCHOLGRPGENPT_PSY_A_CORE FT
Patient attended the psychology cognitive-behavior therapy group. The discussion was focused on the topic of Convert Modeling. Group expectations, guidelines, confidentiality and its limitations were reviewed. The group began with defining the term convert modeling then outlining several examples. Group members then learned about how to visualize and practice convert modeling techniques as well as its implementation.  Group members demonstrated their understanding through active participation, discussion, and by asking clarifying questions. Discussion stemmed from the group's focus on the connection between thoughts, feelings and behaviors. Group members were provided with a handout describing the concepts and strategies discussed.
Patient attended the psychology cognitive-behavior therapy group. The discussion was focused on the topic of Goal Setting.  Group expectations, guidelines and confidentiality were reviewed.  The group began with a description of goal setting using the SMART acronym. Group members learned about how to take the necessary steps towards setting goals that are specific, measurable, achievable, realistic, and time-limited. Group members were then encouraged to set goals following such steps and identify potential obstacles and solutions. Discussion stemmed from the group's focus on the connection between thoughts, feelings and behaviors.  Group members demonstrated their understanding through active participation, discussion, and by asking clarifying questions.  Members were also provided with a handout describing the concepts and strategies discussed during group.

## 2022-09-23 NOTE — BH DISCHARGE NOTE NURSING/SOCIAL WORK/PSYCH REHAB - NSDCPRGOAL_PSY_ALL_CORE
Writer met with patient to safety plan for discharge and discuss the patient’s progress throughout the inpatient stay. Patient was receptive to safety planning and readily identified coping skills, triggers, social support, and reasons for living. Upon admission, pt. presented with depression and suicidal ideation. During inpatient stay the pt. participated in the majority of the groups and was able to utilize the things that she learned to cope throughout her inpatient stay. At discharge pt. presents as calm and happy to go home. Pt. reported feeling excited for the future and stated that she has plans with her boyfriend to go apple picking when she gets out. On top of being future oriented pt. stated that she has plans to use what she has learned to help others. Pt. stated that she wants to look into becoming a peer advocate or a mental health worker in the future. Pt. met her psych rehab goal of coping skills as evidenced by her completing her safety plan, utilizing coloring on the unit and talking to her peers and outside support while here. Pt. exhibits medication compliance, good ADLs, behavioral control and denies SI/HI/TH/AH/VH.

## 2022-09-23 NOTE — BH SAFETY PLAN - ENVIRONMENT SAFETY 1:
Engaging with people through physical touch. "Like when I tell my friends if they can just give me a hug. hat physical touch makes me feel safe".

## 2022-09-23 NOTE — BH PSYCHOLOGY - GROUP THERAPY NOTE - TOKEN PULL-DIAGNOSIS
Primary Diagnosis:  Borderline personality disorder [F60.3]        Problem Dx:   Major depressive disorder, recurrent [F33.9]      
Primary Diagnosis:  Borderline personality disorder [F60.3]        Problem Dx:   Major depressive disorder, recurrent [F33.9]

## 2022-09-23 NOTE — BH PSYCHOLOGY - GROUP THERAPY NOTE - NSBHPSYCHOLPARTICIPCOMMENT_PSY_A_CORE FT
Patient appeared attentive and interested in the group discussion. Patient was able to read out loud when prompted. Patient left the session briefly and returned to hear seat. Patient was observed completing the provided worksheet and contributed to the group discussion. Patient was able to engage with the  and other members appropriately.

## 2022-09-23 NOTE — BH DISCHARGE NOTE NURSING/SOCIAL WORK/PSYCH REHAB - DISCHARGE INSTRUCTIONS AFTERCARE APPOINTMENTS
In order to check the location, date, or time of your aftercare appointment, please refer to your Discharge Instructions Document given to you upon leaving the hospital.  If you have lost the instructions please call [phone]

## 2022-09-23 NOTE — BH INPATIENT PSYCHIATRY PROGRESS NOTE - MSE UNSTRUCTURED FT
Pt is a 21yo woman, with good hygiene and grooming, interviewed while seated in day room. The patient is cooperative on interview. Fair eye contact. Nopsychomotor agitation or retardation noted. The patient's speech was fluent, normal in tone, regular rate and volume. The patient's mood is "good."  Affect is euthymic, bright, full, often laughing and joking with treatment team.  Thought content: Pt is hopeful and motivated to attend therapy. Denies SIIP and HIIP. No evidence of delusions. No evidence of perceptual disturbances. Insight is fair. Judgement is fair. Impulse control is intact at this time.    Pt is a 23yo woman, with good hygiene and grooming, interviewed while seated in day room. The patient is cooperative on interview. Fair eye contact. No psychomotor agitation or retardation noted. The patient's speech was fluent, normal in tone, regular rate and volume. The patient's mood is "good."  Affect is euthymic, bright, full.  Thought content: Pt is hopeful and motivated to attend therapy. Denies SIIP and HIIP. No evidence of delusions. No evidence of perceptual disturbances. Insight is fair. Judgement is fair. Impulse control is intact at this time.

## 2022-09-23 NOTE — BH INPATIENT PSYCHIATRY PROGRESS NOTE - NSBHATTESTBILLINGAW_PSY_A_CORE
[id number] Inpatient care - high complexity - 35 minutes
[id number]-Cylwcnnczu Inpatient care - moderate complexity - 25 minutes
[id number] Inpatient care - moderate complexity - 25 minutes

## 2022-09-23 NOTE — BH DISCHARGE NOTE NURSING/SOCIAL WORK/PSYCH REHAB - PATIENT PORTAL LINK FT
You can access the FollowMyHealth Patient Portal offered by [facility] by registering at the following website: [email or web address]. By joining [email or web address]’s FollowMyHealth portal, you will also be able to view your health information using other applications (apps) compatible with our system.

## 2022-09-23 NOTE — BH INPATIENT PSYCHIATRY PROGRESS NOTE - NSBHCHARTREVIEWVS_PSY_A_CORE FT
Vital Signs Last 24 Hrs  T(C): 36.2 (09-22-22 @ 06:29), Max: 36.2 (09-22-22 @ 06:29)  T(F): 97.1 (09-22-22 @ 06:29), Max: 97.1 (09-22-22 @ 06:29)  HR: --  BP: --  BP(mean): --  RR: --  SpO2: --    Orthostatic VS  09-22-22 @ 06:29  Lying BP: --/-- HR: --  Sitting BP: 114/69 HR: 81  Standing BP: --/-- HR: --  Site: --  Mode: --  Orthostatic VS  09-21-22 @ 08:17  Lying BP: --/-- HR: --  Sitting BP: 115/75 HR: 89  Standing BP: 114/74 HR: 97  Site: --  Mode: --  
Vital Signs Last 24 Hrs  T(C): 36.6 (09-21-22 @ 08:17), Max: 36.6 (09-21-22 @ 08:17)  T(F): 97.9 (09-21-22 @ 08:17), Max: 97.9 (09-21-22 @ 08:17)  HR: --  BP: --  BP(mean): --  RR: --  SpO2: --    Orthostatic VS  09-21-22 @ 08:17  Lying BP: --/-- HR: --  Sitting BP: 115/75 HR: 89  Standing BP: 114/74 HR: 97  Site: --  Mode: --  Orthostatic VS  09-20-22 @ 08:42  Lying BP: --/-- HR: --  Sitting BP: 112/73 HR: 81  Standing BP: 101/67 HR: 90  Site: --  Mode: --  Orthostatic VS  09-19-22 @ 19:21  Lying BP: --/-- HR: --  Sitting BP: 137/88 HR: 90  Standing BP: 128/87 HR: 100  Site: --  Mode: --  
Vital Signs Last 24 Hrs  T(C): 36.7 (09-23-22 @ 08:28), Max: 36.7 (09-23-22 @ 08:28)  T(F): 98 (09-23-22 @ 08:28), Max: 98 (09-23-22 @ 08:28)  HR: --  BP: --  BP(mean): --  RR: --  SpO2: --    Orthostatic VS  09-23-22 @ 08:28  Lying BP: --/-- HR: --  Sitting BP: 110/71 HR: 87  Standing BP: --/-- HR: --  Site: --  Mode: --  Orthostatic VS  09-22-22 @ 06:29  Lying BP: --/-- HR: --  Sitting BP: 114/69 HR: 81  Standing BP: --/-- HR: --  Site: --  Mode: --

## 2022-09-23 NOTE — BH INPATIENT PSYCHIATRY PROGRESS NOTE - CURRENT MEDICATION
MEDICATIONS  (STANDING):  escitalopram 15 milliGRAM(s) Oral daily  metFORMIN 500 milliGRAM(s) Oral two times a day    MEDICATIONS  (PRN):  acetaminophen     Tablet .. 650 milliGRAM(s) Oral every 6 hours PRN Temp greater or equal to 38C (100.4F), Mild Pain (1 - 3), Moderate Pain (4 - 6)  hydrOXYzine hydrochloride 25 milliGRAM(s) Oral every 6 hours PRN anxiety  LORazepam   Injectable 2 milliGRAM(s) IntraMuscular once PRN Agitation  

## 2022-09-23 NOTE — BH INPATIENT PSYCHIATRY PROGRESS NOTE - NSBHATTESTCOMMENTATTENDFT_PSY_A_CORE
Pt remains with improved mood and continues to deny SIIP. Feeling more hopeful about the future, motivated to further engage in treatment. Slept well last night. Remains in good behavioral control. Will c/w Lexapro 15mg daily and monitor for sustained improvement. 
Pt remains with stable mood and denying SIIP. Feeling very hopeful about the future. Tending to ADLs and now attending some groups. Will c/w Lexapro. Dispo planning underway. 
Pt remains with stable mood and continues to deny SIIP. She is hopeful, sleeping well, in good behavioral control, and tending to ADLs appropriately. She remains motivated to further engage in outpt treatment. Planning to take medical leave from school. Now hoping for a career in the field of psychiatry. Given clinical improvement, will discharge pt today. Pt to c/w Lexapro 15mg daily. Plan for f/u at [facility].

## 2022-09-23 NOTE — BH INPATIENT PSYCHIATRY PROGRESS NOTE - NSICDXBHPRIMARYDX_PSY_ALL_CORE
Borderline personality disorder   F60.3  

## 2022-09-23 NOTE — BH PSYCHOLOGY - GROUP THERAPY NOTE - NSBHPSYCHOLRESPONSE_PSY_A_CORE
Symptoms reduced/Coping skills acquired/Accepted support
Coping skills acquired/Insight displayed/Accepted support

## 2022-09-23 NOTE — BH INPATIENT PSYCHIATRY PROGRESS NOTE - PRN MEDS
MEDICATIONS  (PRN):  acetaminophen     Tablet .. 650 milliGRAM(s) Oral every 6 hours PRN Temp greater or equal to 38C (100.4F), Mild Pain (1 - 3), Moderate Pain (4 - 6)  hydrOXYzine hydrochloride 25 milliGRAM(s) Oral every 6 hours PRN anxiety  LORazepam   Injectable 2 milliGRAM(s) IntraMuscular once PRN Agitation  

## 2022-09-23 NOTE — BH INPATIENT PSYCHIATRY PROGRESS NOTE - NSBHASSESSSUMMFT_PSY_ALL_CORE
Pt is a 23yo woman who lives with her mother, student at [employer], with PMHx T2DM, and PPHx depression, no prior hospitalizations, hx of several prior suicide attempts in the distant past, currently in outpt treatment with psychiatrist and therapist (prescribed lexpro for past 6 years), presenting after aborted suicide attempt related to stressors of losing financial aid for college and break-up with boyfriend. On further evaluation pt adamantly denies suicidal intent and report that planned hanging was a means to get attention, help, and support from loved ones. Pt reports symptoms of affective instability, unstable interpersonal relationships, fear of abandonment, poor sense of self, transient paranoid ideation, and episodes on intense anger; such symptoms are most consistent with borderline personality disorder. Psychoeducation provided to pt, which she found helpful.     Pt continues to report improved mood and denies SIIP, feeling hopeful about the future and motivated to engage in treatment. Monitoring for sustained improvement.     Plan: c/w Lexapro 15mg daily.    Pt is a 21yo woman who lives with her mother, student at [employer], with PMHx T2DM, and PPHx depression, no prior hospitalizations, hx of several prior suicide attempts in the distant past, currently in outpt treatment with psychiatrist and therapist (prescribed lexpro for past 6 years), presenting after aborted suicide attempt related to stressors of losing financial aid for college and break-up with boyfriend. On further evaluation pt adamantly denies suicidal intent and report that planned hanging was a means to get attention, help, and support from loved ones. Pt reports symptoms of affective instability, unstable interpersonal relationships, fear of abandonment, poor sense of self, transient paranoid ideation, and episodes on intense anger; such symptoms are most consistent with borderline personality disorder. Psychoeducation provided to pt, which she found helpful.     Pt continues to report improved mood and denies SIIP, feeling hopeful about the future and motivated to engage in treatment. Family and boyfriend remain supportive. Pt in good behavioral control and tending to ADLs appropriately. Plan for discharge today. Pt will c/w Lexapro 15mg daily.

## 2022-09-23 NOTE — BH SAFETY PLAN - WARNING SIGN 1
stop crying/losing emotions/ going numb - "I am generally a very emotional person so when I g don't feel my emotions I know that is a problem"

## 2022-09-23 NOTE — BH DISCHARGE NOTE NURSING/SOCIAL WORK/PSYCH REHAB - NSDCPRRECOMMEND_PSY_ALL_CORE
Psychiatric Rehabilitation staff recommends that the patient engage in outpatient services for continued medication and symptom management. Upon discharge, patient has an appointment scheduled. See Aftercare referral for more information.

## 2022-09-23 NOTE — BH DISCHARGE NOTE NURSING/SOCIAL WORK/PSYCH REHAB - NSCDUDCCRISIS_PSY_A_CORE
[facility]  1 (876) [facility]LL ([phone])  Text "WELL" to 27892  Website: www.[employer]/.[employer] 1 (761) 401-JTXR (1788) Website: www.safehorizon.org/.National Suicide Prevention Lifeline 8 (181) 316-1307/.  [employer]  1 (201) [employer] (061-0668)/.  [facility]  [address] [place] [address] [phone]   Hours:  Monday through Friday from 9 AM to 3 PM/.  [employer] - [facility] Crisis Line  2 [phone], Option 1

## 2022-09-23 NOTE — BH INPATIENT PSYCHIATRY PROGRESS NOTE - NSBHMETABOLIC_PSY_ALL_CORE_FT
BMI:   HbA1c:   Glucose: POCT Blood Glucose.: 250 mg/dL (09-19-22 @ 14:12)    BP: 126/89 (09-19-22 @ 17:48) (126/89 - 126/89)  Lipid Panel: 
BMI:   HbA1c:   Glucose: POCT Blood Glucose.: 250 mg/dL (09-19-22 @ 14:12)    BP: 126/89 (09-19-22 @ 17:48) (126/89 - 128/78)  Lipid Panel: 
BMI:   HbA1c:   Glucose: POCT Blood Glucose.: 250 mg/dL (09-19-22 @ 14:12)    BP: --  Lipid Panel:

## 2022-09-23 NOTE — BH PSYCHOLOGY - GROUP THERAPY NOTE - NSPSYCHOLGRPGENGOAL_PSY_A_CORE
assessment/decrease symptoms/improve level of independent functioning/improve social/vocational/coping skills/prevent relapse/psychoeducation/treatment compliance
assessment/decrease symptoms/improve level of independent functioning/improve social/vocational/coping skills/psychoeducation/treatment compliance

## 2022-09-23 NOTE — BH INPATIENT PSYCHIATRY PROGRESS NOTE - NSBHFUPINTERVALHXFT_PSY_A_CORE
No acute events overnight. Pt eating and sleeping well. Denies medication side effects. States mood is much better and she does not feel down or depressed. She says her focus once she is discharged is employment and moving into the home of her boyfriend and his family. Pt states her mother is open to the pt's main place of residence being her boyfriend's home. She remains motivated to attend outpatient therapy in order to address stressors and create personal goals for herself. Pt says her hospital stay at [facility] has lead to interest in a career in mental health. Denies SIIP and HIIP.

## 2023-01-25 NOTE — ED ADULT NURSE NOTE - NS_BHTRGCALCULATEDSCORE_ED_A_ED_FT
4

## 2023-05-01 NOTE — ED ADULT NURSE NOTE - PAIN: PRESENCE, MLM
Silver Nitrate Text: The wound bed was treated with silver nitrate after the biopsy was performed. denies pain/discomfort

## 2023-10-05 ENCOUNTER — EMERGENCY (EMERGENCY)
Facility: HOSPITAL | Age: 23
LOS: 1 days | Discharge: HOME | End: 2023-10-05
Attending: EMERGENCY MEDICINE
Payer: COMMERCIAL

## 2023-10-05 VITALS
SYSTOLIC BLOOD PRESSURE: 133 MMHG | RESPIRATION RATE: 18 BRPM | HEART RATE: 101 BPM | DIASTOLIC BLOOD PRESSURE: 88 MMHG | OXYGEN SATURATION: 98 % | TEMPERATURE: 99 F

## 2023-10-05 PROCEDURE — 99284 EMERGENCY DEPT VISIT MOD MDM: CPT

## 2023-10-06 VITALS — HEART RATE: 86 BPM

## 2023-10-06 LAB
ALBUMIN SERPL ELPH-MCNC: 4.6 G/DL — SIGNIFICANT CHANGE UP (ref 3.3–5.2)
ALP SERPL-CCNC: 47 U/L — SIGNIFICANT CHANGE UP (ref 40–120)
ALT FLD-CCNC: 26 U/L — SIGNIFICANT CHANGE UP
ANION GAP SERPL CALC-SCNC: 14 MMOL/L — SIGNIFICANT CHANGE UP (ref 5–17)
APPEARANCE UR: ABNORMAL
AST SERPL-CCNC: 19 U/L — SIGNIFICANT CHANGE UP
BACTERIA # UR AUTO: ABNORMAL
BASOPHILS # BLD AUTO: 0.05 K/UL — SIGNIFICANT CHANGE UP (ref 0–0.2)
BASOPHILS NFR BLD AUTO: 0.4 % — SIGNIFICANT CHANGE UP (ref 0–2)
BILIRUB SERPL-MCNC: 0.3 MG/DL — LOW (ref 0.4–2)
BILIRUB UR-MCNC: NEGATIVE — SIGNIFICANT CHANGE UP
BUN SERPL-MCNC: 6 MG/DL — LOW (ref 8–20)
C TRACH RRNA SPEC QL NAA+PROBE: SIGNIFICANT CHANGE UP
CALCIUM SERPL-MCNC: 10.1 MG/DL — SIGNIFICANT CHANGE UP (ref 8.4–10.5)
CHLORIDE SERPL-SCNC: 98 MMOL/L — SIGNIFICANT CHANGE UP (ref 96–108)
CO2 SERPL-SCNC: 26 MMOL/L — SIGNIFICANT CHANGE UP (ref 22–29)
COLOR SPEC: YELLOW — SIGNIFICANT CHANGE UP
CREAT SERPL-MCNC: 0.57 MG/DL — SIGNIFICANT CHANGE UP (ref 0.5–1.3)
DIFF PNL FLD: ABNORMAL
EGFR: 130 ML/MIN/1.73M2 — SIGNIFICANT CHANGE UP
EOSINOPHIL # BLD AUTO: 0.17 K/UL — SIGNIFICANT CHANGE UP (ref 0–0.5)
EOSINOPHIL NFR BLD AUTO: 1.4 % — SIGNIFICANT CHANGE UP (ref 0–6)
EPI CELLS # UR: SIGNIFICANT CHANGE UP
GLUCOSE SERPL-MCNC: 166 MG/DL — HIGH (ref 70–99)
GLUCOSE UR QL: 50 MG/DL
HCG SERPL-ACNC: <4 MIU/ML — SIGNIFICANT CHANGE UP
HCT VFR BLD CALC: 40.9 % — SIGNIFICANT CHANGE UP (ref 34.5–45)
HGB BLD-MCNC: 13.1 G/DL — SIGNIFICANT CHANGE UP (ref 11.5–15.5)
HIV 1 & 2 AB SERPL IA.RAPID: SIGNIFICANT CHANGE UP
IMM GRANULOCYTES NFR BLD AUTO: 0.4 % — SIGNIFICANT CHANGE UP (ref 0–0.9)
KETONES UR-MCNC: NEGATIVE — SIGNIFICANT CHANGE UP
LEUKOCYTE ESTERASE UR-ACNC: ABNORMAL
LIDOCAIN IGE QN: 47 U/L — SIGNIFICANT CHANGE UP (ref 22–51)
LYMPHOCYTES # BLD AUTO: 3.72 K/UL — HIGH (ref 1–3.3)
LYMPHOCYTES # BLD AUTO: 31.7 % — SIGNIFICANT CHANGE UP (ref 13–44)
MCHC RBC-ENTMCNC: 26.2 PG — LOW (ref 27–34)
MCHC RBC-ENTMCNC: 32 GM/DL — SIGNIFICANT CHANGE UP (ref 32–36)
MCV RBC AUTO: 81.8 FL — SIGNIFICANT CHANGE UP (ref 80–100)
MONOCYTES # BLD AUTO: 0.6 K/UL — SIGNIFICANT CHANGE UP (ref 0–0.9)
MONOCYTES NFR BLD AUTO: 5.1 % — SIGNIFICANT CHANGE UP (ref 2–14)
N GONORRHOEA RRNA SPEC QL NAA+PROBE: SIGNIFICANT CHANGE UP
NEUTROPHILS # BLD AUTO: 7.15 K/UL — SIGNIFICANT CHANGE UP (ref 1.8–7.4)
NEUTROPHILS NFR BLD AUTO: 61 % — SIGNIFICANT CHANGE UP (ref 43–77)
NITRITE UR-MCNC: NEGATIVE — SIGNIFICANT CHANGE UP
PH UR: 8 — SIGNIFICANT CHANGE UP (ref 5–8)
PLATELET # BLD AUTO: 370 K/UL — SIGNIFICANT CHANGE UP (ref 150–400)
POTASSIUM SERPL-MCNC: 4.2 MMOL/L — SIGNIFICANT CHANGE UP (ref 3.5–5.3)
POTASSIUM SERPL-SCNC: 4.2 MMOL/L — SIGNIFICANT CHANGE UP (ref 3.5–5.3)
PROT SERPL-MCNC: 7.7 G/DL — SIGNIFICANT CHANGE UP (ref 6.6–8.7)
PROT UR-MCNC: 15
RBC # BLD: 5 M/UL — SIGNIFICANT CHANGE UP (ref 3.8–5.2)
RBC # FLD: 13.4 % — SIGNIFICANT CHANGE UP (ref 10.3–14.5)
RBC CASTS # UR COMP ASSIST: ABNORMAL /HPF (ref 0–4)
SODIUM SERPL-SCNC: 138 MMOL/L — SIGNIFICANT CHANGE UP (ref 135–145)
SP GR SPEC: 1.01 — SIGNIFICANT CHANGE UP (ref 1.01–1.02)
T PALLIDUM AB TITR SER: NEGATIVE — SIGNIFICANT CHANGE UP
UROBILINOGEN FLD QL: NEGATIVE MG/DL — SIGNIFICANT CHANGE UP
WBC # BLD: 11.74 K/UL — HIGH (ref 3.8–10.5)
WBC # FLD AUTO: 11.74 K/UL — HIGH (ref 3.8–10.5)
WBC UR QL: ABNORMAL /HPF (ref 0–5)

## 2023-10-06 PROCEDURE — 76830 TRANSVAGINAL US NON-OB: CPT | Mod: 26

## 2023-10-06 PROCEDURE — 87077 CULTURE AEROBIC IDENTIFY: CPT

## 2023-10-06 PROCEDURE — 99284 EMERGENCY DEPT VISIT MOD MDM: CPT | Mod: 25

## 2023-10-06 PROCEDURE — 80053 COMPREHEN METABOLIC PANEL: CPT

## 2023-10-06 PROCEDURE — 84702 CHORIONIC GONADOTROPIN TEST: CPT

## 2023-10-06 PROCEDURE — 36415 COLL VENOUS BLD VENIPUNCTURE: CPT

## 2023-10-06 PROCEDURE — 87086 URINE CULTURE/COLONY COUNT: CPT

## 2023-10-06 PROCEDURE — 81001 URINALYSIS AUTO W/SCOPE: CPT

## 2023-10-06 PROCEDURE — 76830 TRANSVAGINAL US NON-OB: CPT

## 2023-10-06 PROCEDURE — 96374 THER/PROPH/DIAG INJ IV PUSH: CPT

## 2023-10-06 PROCEDURE — 76856 US EXAM PELVIC COMPLETE: CPT | Mod: 26

## 2023-10-06 PROCEDURE — 87491 CHLMYD TRACH DNA AMP PROBE: CPT

## 2023-10-06 PROCEDURE — 83690 ASSAY OF LIPASE: CPT

## 2023-10-06 PROCEDURE — 96376 TX/PRO/DX INJ SAME DRUG ADON: CPT

## 2023-10-06 PROCEDURE — 76856 US EXAM PELVIC COMPLETE: CPT

## 2023-10-06 PROCEDURE — 87591 N.GONORRHOEAE DNA AMP PROB: CPT

## 2023-10-06 PROCEDURE — 86780 TREPONEMA PALLIDUM: CPT

## 2023-10-06 PROCEDURE — 86703 HIV-1/HIV-2 1 RESULT ANTBDY: CPT

## 2023-10-06 PROCEDURE — 96375 TX/PRO/DX INJ NEW DRUG ADDON: CPT

## 2023-10-06 PROCEDURE — 85025 COMPLETE CBC W/AUTO DIFF WBC: CPT

## 2023-10-06 RX ORDER — ACETAMINOPHEN 500 MG
1000 TABLET ORAL ONCE
Refills: 0 | Status: COMPLETED | OUTPATIENT
Start: 2023-10-06 | End: 2023-10-06

## 2023-10-06 RX ORDER — KETOROLAC TROMETHAMINE 30 MG/ML
15 SYRINGE (ML) INJECTION ONCE
Refills: 0 | Status: DISCONTINUED | OUTPATIENT
Start: 2023-10-06 | End: 2023-10-06

## 2023-10-06 RX ORDER — CEFTRIAXONE 500 MG/1
1000 INJECTION, POWDER, FOR SOLUTION INTRAMUSCULAR; INTRAVENOUS ONCE
Refills: 0 | Status: DISCONTINUED | OUTPATIENT
Start: 2023-10-06 | End: 2023-10-06

## 2023-10-06 RX ORDER — CEPHALEXIN 500 MG
1 CAPSULE ORAL
Qty: 28 | Refills: 0
Start: 2023-10-06 | End: 2023-10-12

## 2023-10-06 RX ORDER — ONDANSETRON 8 MG/1
4 TABLET, FILM COATED ORAL ONCE
Refills: 0 | Status: COMPLETED | OUTPATIENT
Start: 2023-10-06 | End: 2023-10-06

## 2023-10-06 RX ORDER — CEFTRIAXONE 500 MG/1
1000 INJECTION, POWDER, FOR SOLUTION INTRAMUSCULAR; INTRAVENOUS ONCE
Refills: 0 | Status: COMPLETED | OUTPATIENT
Start: 2023-10-06 | End: 2023-10-06

## 2023-10-06 RX ORDER — IBUPROFEN 200 MG
1 TABLET ORAL
Qty: 20 | Refills: 0
Start: 2023-10-06 | End: 2023-10-10

## 2023-10-06 RX ADMIN — Medication 15 MILLIGRAM(S): at 00:32

## 2023-10-06 RX ADMIN — Medication 400 MILLIGRAM(S): at 02:14

## 2023-10-06 RX ADMIN — ONDANSETRON 4 MILLIGRAM(S): 8 TABLET, FILM COATED ORAL at 00:32

## 2023-10-06 RX ADMIN — Medication 15 MILLIGRAM(S): at 04:10

## 2023-10-06 RX ADMIN — CEFTRIAXONE 1000 MILLIGRAM(S): 500 INJECTION, POWDER, FOR SOLUTION INTRAMUSCULAR; INTRAVENOUS at 02:03

## 2023-10-06 NOTE — ED PROVIDER NOTE - NSFOLLOWUPINSTRUCTIONS_ED_ALL_ED_FT
Urinary Tract Infection    A urinary tract infection (UTI) is an infection of any part of the urinary tract, which includes the kidneys, ureters, bladder, and urethra. Risk factors include ignoring your need to urinate, wiping back to front if female, being an uncircumcised male, and having diabetes or a weak immune system. Symptoms include frequent urination, pain or burning with urination, foul smelling urine, cloudy urine, pain in the lower abdomen, blood in the urine, and fever. If you were prescribed an antibiotic medicine, take it as told by your health care provider. Do not stop taking the antibiotic even if you start to feel better.    SEEK IMMEDIATE MEDICAL CARE IF YOU HAVE ANY OF THE FOLLOWING SYMPTOMS: severe back or abdominal pain, fever, inability to keep fluids or medicine down, dizziness/lightheadedness, or a change in mental status. - Please follow-up with your primary care doctor in the next 1-2 days.  Please call tomorrow for an appointment.  If you cannot follow-up with your primary care doctor please return to the ED for any urgent issues.  - You were given a copy of the tests performed today.  Please bring the results with you and review them with your primary care doctor.  - If you have any worsening of symptoms or any other concerns please return to the ED immediately.  - Please continue taking your home medications as directed.     Urinary Tract Infection    A urinary tract infection (UTI) is an infection of any part of the urinary tract, which includes the kidneys, ureters, bladder, and urethra. Risk factors include ignoring your need to urinate, wiping back to front if female, being an uncircumcised male, and having diabetes or a weak immune system. Symptoms include frequent urination, pain or burning with urination, foul smelling urine, cloudy urine, pain in the lower abdomen, blood in the urine, and fever. If you were prescribed an antibiotic medicine, take it as told by your health care provider. Do not stop taking the antibiotic even if you start to feel better.    SEEK IMMEDIATE MEDICAL CARE IF YOU HAVE ANY OF THE FOLLOWING SYMPTOMS: severe back or abdominal pain, fever, inability to keep fluids or medicine down, dizziness/lightheadedness, or a change in mental status.

## 2023-10-06 NOTE — ED PROVIDER NOTE - PATIENT PORTAL LINK FT
You can access the FollowMyHealth Patient Portal offered by [facility] by registering at the following website: [email or web address]. By joining [facility]’s FollowMyHealth portal, you will also be able to view your health information using other applications (apps) compatible with our system.

## 2023-10-06 NOTE — ED PROVIDER NOTE - CLINICAL SUMMARY MEDICAL DECISION MAKING FREE TEXT BOX
22 yo female with pmhx of DM on ozempic presents with lower abd pain x2 days.  Pt with 11-25 wbc's, with few bacteria. will cover for uti. no cvat. 22 yo female with pmhx of DM on ozempic presents with lower abd pain x2 days.  Pt with 11-25 wbc's, with few bacteria. will cover for uti. no cvat. pelvic US performed without evidence of acute pathology.  strict return precautions explained. vss, hemodynamically stable.

## 2023-10-06 NOTE — ED PROVIDER NOTE - NS ED ATTENDING STATEMENT MOD
This was a shared visit with the PHILPI. I reviewed and verified the documentation and independently performed the documented:

## 2023-10-06 NOTE — ED PROVIDER NOTE - PHYSICAL EXAMINATION
Gen: No acute distress, non toxic  HEENT: Mucous membranes moist, pink conjunctivae, EOMI  CV: RRR, nl s1/s2.  Resp: CTAB, normal rate and effort  GI: Abdomen soft, nondistended. +ttp in the left pelvic area. No rebound, no guarding. no ecchymosis   : No CVAT b/l  Neuro: A&O x 3, moving all 4 extremities  MSK: No spine or joint tenderness to palpation  Skin: No rashes. intact and perfused. cap refill <2sec Gen: No acute distress, non toxic  HEENT: Mucous membranes moist, pink conjunctivae, EOMI. PERRL. Airway patent.   CV: RRR, nl s1/s2.  Resp: CTAB, normal rate and effort  GI: Abdomen soft, nondistended. +ttp in the left pelvic area. No rebound, no guarding. no ecchymosis   : No CVAT b/l  Neuro: A&O x 3, moving all 4 extremities  MSK: No spine or joint tenderness to palpation  Skin: No rashes. intact and perfused. cap refill <2sec

## 2023-10-06 NOTE — ED ADULT NURSE NOTE - OBJECTIVE STATEMENT
Assumed care of pt at 2345 in . Pt A&Ox4 c/o abd pain and flank pain that started yesterday, and pt complains of having loose BMs for the last 5 days and having painful urinations. Pt endorses having kidney stones 2 years ago, but has had no complications since. Resp even unlabored, abd soft nontender, but pt guarding, denies v/d but complains of nausea.

## 2023-10-06 NOTE — ED PROVIDER NOTE - OBJECTIVE STATEMENT
22 yo female with pmhx of DM on ozempic presents with lower abd pain x2 days. States that 2 days ago started to develop lower abd pain with radiation into the lower back. Also endorsing dysuria and nausea. Denies hx of UTI's. Denies recent abx use. Denies taking anything for pain. Denies fever, chills, body aches, dizziness, LOC, vision changes, cp, palpitations, sob, vomiting, diarrhea, hematuria, vaginal discharge, vaginal itching, paresthesias in the extremities, rashes.   LMP: 9/13/23. Pt states she is sexually active with a new partner. Denies of STD's, wants to tested.

## 2023-10-07 LAB
CULTURE RESULTS: SIGNIFICANT CHANGE UP
SPECIMEN SOURCE: SIGNIFICANT CHANGE UP

## 2023-10-16 PROCEDURE — 90834 PSYTX W PT 45 MINUTES: CPT

## 2024-02-23 ENCOUNTER — APPOINTMENT (OUTPATIENT)
Dept: FAMILY MEDICINE | Facility: CLINIC | Age: 24
End: 2024-02-23

## 2024-04-14 ENCOUNTER — NON-APPOINTMENT (OUTPATIENT)
Age: 24
End: 2024-04-14

## 2024-06-23 ENCOUNTER — NON-APPOINTMENT (OUTPATIENT)
Age: 24
End: 2024-06-23

## 2024-07-03 ENCOUNTER — EMERGENCY (EMERGENCY)
Facility: HOSPITAL | Age: 24
LOS: 1 days | Discharge: HOME | End: 2024-07-03
Attending: EMERGENCY MEDICINE
Payer: COMMERCIAL

## 2024-07-03 VITALS
RESPIRATION RATE: 18 BRPM | SYSTOLIC BLOOD PRESSURE: 110 MMHG | DIASTOLIC BLOOD PRESSURE: 77 MMHG | HEART RATE: 108 BPM | OXYGEN SATURATION: 97 % | TEMPERATURE: 99 F

## 2024-07-03 VITALS
RESPIRATION RATE: 18 BRPM | HEART RATE: 118 BPM | DIASTOLIC BLOOD PRESSURE: 79 MMHG | SYSTOLIC BLOOD PRESSURE: 114 MMHG | TEMPERATURE: 98 F | OXYGEN SATURATION: 99 % | WEIGHT: 164.02 LBS | HEIGHT: 66 IN

## 2024-07-03 LAB
ALBUMIN SERPL ELPH-MCNC: 4.4 G/DL — SIGNIFICANT CHANGE UP (ref 3.3–5.2)
ALP SERPL-CCNC: 53 U/L — SIGNIFICANT CHANGE UP (ref 40–120)
ALT FLD-CCNC: 18 U/L — SIGNIFICANT CHANGE UP
ANION GAP SERPL CALC-SCNC: 15 MMOL/L — SIGNIFICANT CHANGE UP (ref 5–17)
ANION GAP SERPL CALC-SCNC: 21 MMOL/L — HIGH (ref 5–17)
APPEARANCE UR: ABNORMAL
AST SERPL-CCNC: 24 U/L — SIGNIFICANT CHANGE UP
BACTERIA # UR AUTO: ABNORMAL /HPF
BASE EXCESS BLDV CALC-SCNC: -3.1 MMOL/L — LOW (ref -2–3)
BASOPHILS # BLD AUTO: 0.05 K/UL — SIGNIFICANT CHANGE UP (ref 0–0.2)
BASOPHILS NFR BLD AUTO: 0.3 % — SIGNIFICANT CHANGE UP (ref 0–2)
BILIRUB SERPL-MCNC: 0.3 MG/DL — LOW (ref 0.4–2)
BILIRUB UR-MCNC: NEGATIVE — SIGNIFICANT CHANGE UP
BUN SERPL-MCNC: 8.6 MG/DL — SIGNIFICANT CHANGE UP (ref 8–20)
BUN SERPL-MCNC: 9.7 MG/DL — SIGNIFICANT CHANGE UP (ref 8–20)
CALCIUM SERPL-MCNC: 10.1 MG/DL — SIGNIFICANT CHANGE UP (ref 8.4–10.5)
CALCIUM SERPL-MCNC: 8.9 MG/DL — SIGNIFICANT CHANGE UP (ref 8.4–10.5)
CAST: 7 /LPF — HIGH (ref 0–4)
CHLORIDE SERPL-SCNC: 104 MMOL/L — SIGNIFICANT CHANGE UP (ref 96–108)
CHLORIDE SERPL-SCNC: 97 MMOL/L — SIGNIFICANT CHANGE UP (ref 96–108)
CO2 SERPL-SCNC: 19 MMOL/L — LOW (ref 22–29)
CO2 SERPL-SCNC: 20 MMOL/L — LOW (ref 22–29)
COLOR SPEC: YELLOW — SIGNIFICANT CHANGE UP
CREAT SERPL-MCNC: 0.48 MG/DL — LOW (ref 0.5–1.3)
CREAT SERPL-MCNC: 0.53 MG/DL — SIGNIFICANT CHANGE UP (ref 0.5–1.3)
DIFF PNL FLD: NEGATIVE — SIGNIFICANT CHANGE UP
EGFR: 132 ML/MIN/1.73M2 — SIGNIFICANT CHANGE UP
EGFR: 135 ML/MIN/1.73M2 — SIGNIFICANT CHANGE UP
EOSINOPHIL # BLD AUTO: 0.05 K/UL — SIGNIFICANT CHANGE UP (ref 0–0.5)
EOSINOPHIL NFR BLD AUTO: 0.3 % — SIGNIFICANT CHANGE UP (ref 0–6)
GAS PNL BLDV: SIGNIFICANT CHANGE UP
GI PCR PANEL: SIGNIFICANT CHANGE UP
GLUCOSE SERPL-MCNC: 154 MG/DL — HIGH (ref 70–99)
GLUCOSE SERPL-MCNC: 236 MG/DL — HIGH (ref 70–99)
GLUCOSE UR QL: NEGATIVE MG/DL — SIGNIFICANT CHANGE UP
HCG UR QL: NEGATIVE — SIGNIFICANT CHANGE UP
HCO3 BLDV-SCNC: 21 MMOL/L — LOW (ref 22–29)
HCT VFR BLD CALC: 42.3 % — SIGNIFICANT CHANGE UP (ref 34.5–45)
HGB BLD-MCNC: 14.1 G/DL — SIGNIFICANT CHANGE UP (ref 11.5–15.5)
HIV 1 & 2 AB SERPL IA.RAPID: SIGNIFICANT CHANGE UP
IMM GRANULOCYTES NFR BLD AUTO: 0.6 % — SIGNIFICANT CHANGE UP (ref 0–0.9)
KETONES UR-MCNC: 40 MG/DL
LEUKOCYTE ESTERASE UR-ACNC: ABNORMAL
LIDOCAIN IGE QN: 21 U/L — LOW (ref 22–51)
LYMPHOCYTES # BLD AUTO: 1.4 K/UL — SIGNIFICANT CHANGE UP (ref 1–3.3)
LYMPHOCYTES # BLD AUTO: 9.2 % — LOW (ref 13–44)
MCHC RBC-ENTMCNC: 27.8 PG — SIGNIFICANT CHANGE UP (ref 27–34)
MCHC RBC-ENTMCNC: 33.3 GM/DL — SIGNIFICANT CHANGE UP (ref 32–36)
MCV RBC AUTO: 83.4 FL — SIGNIFICANT CHANGE UP (ref 80–100)
MONOCYTES # BLD AUTO: 0.5 K/UL — SIGNIFICANT CHANGE UP (ref 0–0.9)
MONOCYTES NFR BLD AUTO: 3.3 % — SIGNIFICANT CHANGE UP (ref 2–14)
NEUTROPHILS # BLD AUTO: 13.16 K/UL — HIGH (ref 1.8–7.4)
NEUTROPHILS NFR BLD AUTO: 86.3 % — HIGH (ref 43–77)
NITRITE UR-MCNC: NEGATIVE — SIGNIFICANT CHANGE UP
PCO2 BLDV: 33 MMHG — LOW (ref 39–42)
PH BLDV: 7.42 — SIGNIFICANT CHANGE UP (ref 7.32–7.43)
PH UR: 5.5 — SIGNIFICANT CHANGE UP (ref 5–8)
PLATELET # BLD AUTO: 348 K/UL — SIGNIFICANT CHANGE UP (ref 150–400)
PO2 BLDV: 83 MMHG — HIGH (ref 25–45)
POTASSIUM SERPL-MCNC: 4.2 MMOL/L — SIGNIFICANT CHANGE UP (ref 3.5–5.3)
POTASSIUM SERPL-MCNC: 4.5 MMOL/L — SIGNIFICANT CHANGE UP (ref 3.5–5.3)
POTASSIUM SERPL-SCNC: 4.2 MMOL/L — SIGNIFICANT CHANGE UP (ref 3.5–5.3)
POTASSIUM SERPL-SCNC: 4.5 MMOL/L — SIGNIFICANT CHANGE UP (ref 3.5–5.3)
PROT SERPL-MCNC: 8.3 G/DL — SIGNIFICANT CHANGE UP (ref 6.6–8.7)
PROT UR-MCNC: 30 MG/DL
RBC # BLD: 5.07 M/UL — SIGNIFICANT CHANGE UP (ref 3.8–5.2)
RBC # FLD: 13.2 % — SIGNIFICANT CHANGE UP (ref 10.3–14.5)
RBC CASTS # UR COMP ASSIST: 0 /HPF — SIGNIFICANT CHANGE UP (ref 0–4)
SAO2 % BLDV: 97.2 % — SIGNIFICANT CHANGE UP
SODIUM SERPL-SCNC: 137 MMOL/L — SIGNIFICANT CHANGE UP (ref 135–145)
SODIUM SERPL-SCNC: 139 MMOL/L — SIGNIFICANT CHANGE UP (ref 135–145)
SP GR SPEC: 1.02 — SIGNIFICANT CHANGE UP (ref 1–1.03)
SQUAMOUS # UR AUTO: 7 /HPF — HIGH (ref 0–5)
UROBILINOGEN FLD QL: 1 MG/DL — SIGNIFICANT CHANGE UP (ref 0.2–1)
WBC # BLD: 15.25 K/UL — HIGH (ref 3.8–10.5)
WBC # FLD AUTO: 15.25 K/UL — HIGH (ref 3.8–10.5)
WBC UR QL: 5 /HPF — SIGNIFICANT CHANGE UP (ref 0–5)

## 2024-07-03 PROCEDURE — 87507 IADNA-DNA/RNA PROBE TQ 12-25: CPT

## 2024-07-03 PROCEDURE — 96374 THER/PROPH/DIAG INJ IV PUSH: CPT

## 2024-07-03 PROCEDURE — 99284 EMERGENCY DEPT VISIT MOD MDM: CPT

## 2024-07-03 PROCEDURE — 99284 EMERGENCY DEPT VISIT MOD MDM: CPT | Mod: 25

## 2024-07-03 PROCEDURE — 36415 COLL VENOUS BLD VENIPUNCTURE: CPT

## 2024-07-03 PROCEDURE — 83690 ASSAY OF LIPASE: CPT

## 2024-07-03 PROCEDURE — 85025 COMPLETE CBC W/AUTO DIFF WBC: CPT

## 2024-07-03 PROCEDURE — 96375 TX/PRO/DX INJ NEW DRUG ADDON: CPT

## 2024-07-03 PROCEDURE — 87086 URINE CULTURE/COLONY COUNT: CPT

## 2024-07-03 PROCEDURE — 81025 URINE PREGNANCY TEST: CPT

## 2024-07-03 PROCEDURE — 80048 BASIC METABOLIC PNL TOTAL CA: CPT

## 2024-07-03 PROCEDURE — 80053 COMPREHEN METABOLIC PANEL: CPT

## 2024-07-03 PROCEDURE — 87186 SC STD MICRODIL/AGAR DIL: CPT

## 2024-07-03 PROCEDURE — 81001 URINALYSIS AUTO W/SCOPE: CPT

## 2024-07-03 PROCEDURE — 82803 BLOOD GASES ANY COMBINATION: CPT

## 2024-07-03 PROCEDURE — 86703 HIV-1/HIV-2 1 RESULT ANTBDY: CPT

## 2024-07-03 PROCEDURE — 96361 HYDRATE IV INFUSION ADD-ON: CPT

## 2024-07-03 RX ORDER — METOCLOPRAMIDE 5 MG/5ML
10 SOLUTION ORAL ONCE
Refills: 0 | Status: COMPLETED | OUTPATIENT
Start: 2024-07-03 | End: 2024-07-03

## 2024-07-03 RX ORDER — SODIUM CHLORIDE 0.9 % (FLUSH) 0.9 %
1000 SYRINGE (ML) INJECTION ONCE
Refills: 0 | Status: COMPLETED | OUTPATIENT
Start: 2024-07-03 | End: 2024-07-03

## 2024-07-03 RX ORDER — ONDANSETRON HYDROCHLORIDE 2 MG/ML
4 INJECTION INTRAMUSCULAR; INTRAVENOUS ONCE
Refills: 0 | Status: COMPLETED | OUTPATIENT
Start: 2024-07-03 | End: 2024-07-03

## 2024-07-03 RX ORDER — ONDANSETRON HYDROCHLORIDE 2 MG/ML
1 INJECTION INTRAMUSCULAR; INTRAVENOUS
Qty: 12 | Refills: 0
Start: 2024-07-03 | End: 2024-07-06

## 2024-07-03 RX ORDER — LOPERAMIDE HCL 2 MG
2 CAPSULE ORAL ONCE
Refills: 0 | Status: COMPLETED | OUTPATIENT
Start: 2024-07-03 | End: 2024-07-03

## 2024-07-03 RX ADMIN — Medication 1000 MILLILITER(S): at 09:16

## 2024-07-03 RX ADMIN — Medication 1000 MILLILITER(S): at 10:20

## 2024-07-03 RX ADMIN — ONDANSETRON HYDROCHLORIDE 4 MILLIGRAM(S): 2 INJECTION INTRAMUSCULAR; INTRAVENOUS at 09:16

## 2024-07-03 RX ADMIN — Medication 1000 MILLILITER(S): at 10:46

## 2024-07-03 RX ADMIN — METOCLOPRAMIDE 10 MILLIGRAM(S): 5 SOLUTION ORAL at 10:45

## 2024-07-03 RX ADMIN — Medication 2 MILLIGRAM(S): at 13:28

## 2024-10-10 NOTE — ED ADULT TRIAGE NOTE - PAIN RATING/NUMBER SCALE (0-10): ACTIVITY
[No Acute Distress] : no acute distress [Normal Oropharynx] : normal oropharynx [Normal Appearance] : normal appearance [No Neck Mass] : no neck mass [Normal Rate/Rhythm] : normal rate/rhythm [Normal S1, S2] : normal s1, s2 [No Murmurs] : no murmurs [No Resp Distress] : no resp distress [Clear to Auscultation Bilaterally] : clear to auscultation bilaterally [No Abnormalities] : no abnormalities [Benign] : benign [Normal Gait] : normal gait [No Clubbing] : no clubbing [No Cyanosis] : no cyanosis [No Edema] : no edema [FROM] : FROM [Normal Color/ Pigmentation] : normal color/ pigmentation [No Focal Deficits] : no focal deficits 3 [Oriented x3] : oriented x3 [Normal Affect] : normal affect [TextBox_2] : thin, ill appearing

## 2025-03-13 ENCOUNTER — NON-APPOINTMENT (OUTPATIENT)
Age: 25
End: 2025-03-13

## 2025-05-15 ENCOUNTER — NON-APPOINTMENT (OUTPATIENT)
Age: 25
End: 2025-05-15

## 2025-08-08 ENCOUNTER — EMERGENCY (EMERGENCY)
Facility: HOSPITAL | Age: 25
LOS: 1 days | End: 2025-08-08
Attending: EMERGENCY MEDICINE
Payer: MEDICAID

## 2025-08-08 VITALS
TEMPERATURE: 98 F | DIASTOLIC BLOOD PRESSURE: 94 MMHG | RESPIRATION RATE: 20 BRPM | WEIGHT: 164.91 LBS | OXYGEN SATURATION: 98 % | HEART RATE: 130 BPM | SYSTOLIC BLOOD PRESSURE: 154 MMHG | HEIGHT: 66 IN

## 2025-08-08 VITALS
HEART RATE: 87 BPM | SYSTOLIC BLOOD PRESSURE: 134 MMHG | RESPIRATION RATE: 20 BRPM | TEMPERATURE: 98 F | DIASTOLIC BLOOD PRESSURE: 88 MMHG | OXYGEN SATURATION: 98 %

## 2025-08-08 PROCEDURE — 99284 EMERGENCY DEPT VISIT MOD MDM: CPT

## 2025-08-08 PROCEDURE — 93010 ELECTROCARDIOGRAM REPORT: CPT

## 2025-08-08 PROCEDURE — 99283 EMERGENCY DEPT VISIT LOW MDM: CPT | Mod: 25

## 2025-08-08 PROCEDURE — 93005 ELECTROCARDIOGRAM TRACING: CPT

## 2025-08-08 RX ORDER — ONDANSETRON HCL/PF 4 MG/2 ML
8 VIAL (ML) INJECTION ONCE
Refills: 0 | Status: DISCONTINUED | OUTPATIENT
Start: 2025-08-08 | End: 2025-08-08

## 2025-08-08 RX ORDER — OXYCODONE HYDROCHLORIDE AND ACETAMINOPHEN 10; 325 MG/1; MG/1
1 TABLET ORAL
Qty: 12 | Refills: 0
Start: 2025-08-08 | End: 2025-08-10